# Patient Record
Sex: FEMALE | Race: WHITE | NOT HISPANIC OR LATINO | Employment: UNEMPLOYED | URBAN - METROPOLITAN AREA
[De-identification: names, ages, dates, MRNs, and addresses within clinical notes are randomized per-mention and may not be internally consistent; named-entity substitution may affect disease eponyms.]

---

## 2024-01-27 ENCOUNTER — HOSPITAL ENCOUNTER (OUTPATIENT)
Facility: HOSPITAL | Age: 69
Discharge: HOME | End: 2024-01-27
Attending: EMERGENCY MEDICINE
Payer: MEDICARE

## 2024-01-27 VITALS
BODY MASS INDEX: 33.66 KG/M2 | SYSTOLIC BLOOD PRESSURE: 163 MMHG | HEART RATE: 110 BPM | DIASTOLIC BLOOD PRESSURE: 101 MMHG | RESPIRATION RATE: 18 BRPM | OXYGEN SATURATION: 97 % | HEIGHT: 63 IN | TEMPERATURE: 99.6 F | WEIGHT: 190 LBS

## 2024-01-27 DIAGNOSIS — U07.1 COVID-19: Primary | ICD-10-CM

## 2024-01-27 LAB
FLUAV SUBTYP SPEC NAA+PROBE: NOT DETECTED
FLUBV RNA ISLT QL NAA+PROBE: NOT DETECTED
SARS-COV-2 RNA RESP QL NAA+PROBE: DETECTED

## 2024-01-27 PROCEDURE — 87636 SARSCOV2 & INF A&B AMP PRB: CPT | Performed by: NURSE PRACTITIONER

## 2024-01-27 PROCEDURE — G0463 HOSPITAL OUTPT CLINIC VISIT: HCPCS | Performed by: NURSE PRACTITIONER

## 2024-01-27 RX ORDER — GUAIFENESIN AND DEXTROMETHORPHAN HYDROBROMIDE 600; 30 MG/1; MG/1
1 TABLET, EXTENDED RELEASE ORAL 2 TIMES DAILY PRN
Qty: 20 EACH | Refills: 0 | Status: SHIPPED | OUTPATIENT
Start: 2024-01-27

## 2024-01-27 RX ORDER — BENZONATATE 100 MG/1
100 CAPSULE ORAL 4 TIMES DAILY PRN
Qty: 20 CAPSULE | Refills: 0 | Status: SHIPPED | OUTPATIENT
Start: 2024-01-27

## 2024-01-27 NOTE — FSED PROVIDER NOTE
Subjective   History of Present Illness  Patient is 68-year-old female who presents complaining of cough, congestion that started on Thursday.  States she just returned from a cruise.  She denies chest pain, shortness of breath, vomiting.      Review of Systems   Constitutional:  Positive for fatigue.   HENT:  Positive for congestion.    Respiratory:  Positive for cough.    All other systems reviewed and are negative.      Past Medical History:   Diagnosis Date    Arthritis     GERD (gastroesophageal reflux disease)     History of mononucleosis     Hypertension     Knee meniscus pain     right knee       No Known Allergies    Past Surgical History:   Procedure Laterality Date    BACK SURGERY      l 5 herniated disc    BREAST AUGMENTATION Bilateral     bilateral breasts implants    COLONOSCOPY      ENDOSCOPY      KNEE ARTHROSCOPY Left     meniscus    LAPAROSCOPIC TUBAL LIGATION      TONSILLECTOMY      TOTAL KNEE ARTHROPLASTY Left 2/4/2020    Procedure: TOTAL KNEE ARTHROPLASTY;  Surgeon: Ajay Bridges MD;  Location: [facility];  Service: Orthopedics;  Laterality: Left;       Family History   Problem Relation Age of Onset    Malig Hyperthermia Neg Hx        Social History     Socioeconomic History    Marital status:    Tobacco Use    Smoking status: Never    Smokeless tobacco: Never   Vaping Use    Vaping Use: Never used   Substance and Sexual Activity    Alcohol use: Yes     Comment: couple times a year    Drug use: Never    Sexual activity: Defer           Objective   Physical Exam  Vitals and nursing note reviewed.   Constitutional:       Appearance: Normal appearance.   HENT:      Head: Normocephalic and atraumatic.      Nose: Congestion present.   Pulmonary:      Effort: Pulmonary effort is normal.      Breath sounds: Normal breath sounds.   Musculoskeletal:      Cervical back: Normal range of motion and neck supple.   Skin:     General: Skin is warm and dry.      Capillary Refill: Capillary  refill takes less than 2 seconds.   Neurological:      General: No focal deficit present.      Mental Status: She is alert and oriented to person, place, and time.         Procedures           ED Course                                           Medical Decision Making  Patient is positive for COVID-19, she is otherwise nontoxic-appearing.  She is follow-up with her primary care and was given strict return precautions.    Problems Addressed:  COVID-19: complicated acute illness or injury    Risk  OTC drugs.  Prescription drug management.        Final diagnoses:   COVID-19       ED Disposition  ED Disposition       ED Disposition   Discharge    Condition   Stable    Comment   --               Gloria Fleming, APRN   [address]  [place] [id number] [phone]    Call   If symptoms worsen         Medication List        New Prescriptions      benzonatate 100 MG capsule  Commonly known as: TESSALON  Take 1 capsule by mouth 4 (Four) Times a Day As Needed for Cough.     guaifenesin-dextromethorphan  MG tablet sustained-release 12 hour tablet  Take 1 tablet by mouth 2 (Two) Times a Day As Needed (Congestion).               Where to Get Your Medications        These medications were sent to [id number] - [place], [place] - [address] AT [facility] [address] [facility] - [phone]  - [phone] [address] [address]      Phone: [phone]   benzonatate 100 MG capsule  guaifenesin-dextromethorphan  MG tablet sustained-release 12 hour tablet

## 2024-03-06 ENCOUNTER — PREP FOR SURGERY (OUTPATIENT)
Dept: OTHER | Facility: HOSPITAL | Age: 69
End: 2024-03-06

## 2024-03-06 DIAGNOSIS — M17.11 PRIMARY OSTEOARTHRITIS OF RIGHT KNEE: Primary | ICD-10-CM

## 2024-03-06 PROBLEM — M17.9 OSTEOARTHRITIS OF KNEE: Status: ACTIVE | Noted: 2024-03-06

## 2024-03-06 RX ORDER — CHLORHEXIDINE GLUCONATE 500 MG/1
CLOTH TOPICAL 2 TIMES DAILY
Status: CANCELLED | OUTPATIENT
Start: 2024-03-06

## 2024-03-06 RX ORDER — PREGABALIN 75 MG/1
75 CAPSULE ORAL ONCE
Status: CANCELLED | OUTPATIENT
Start: 2024-03-15 | End: 2024-03-06

## 2024-03-06 RX ORDER — ACETAMINOPHEN 10 MG/ML
1000 INJECTION, SOLUTION INTRAVENOUS ONCE
Status: CANCELLED | OUTPATIENT
Start: 2024-03-15 | End: 2024-03-06

## 2024-03-06 RX ORDER — CHLORHEXIDINE GLUCONATE 500 MG/1
CLOTH TOPICAL ONCE
Status: CANCELLED | OUTPATIENT
Start: 2024-03-15 | End: 2024-03-06

## 2024-03-06 NOTE — H&P
Chief Complaint  Right knee pain  Followup: Osteoarthritis of right knee joint  Patient's Care Team  Primary Care Provider: GUSTAVO ROCKWELL: [address] , [place] [address], Ph [phone], Fax [phone] NPI: [id number]  Patient's Pharmacies  [id number] ([place]): [address] , [place] [address], Ph [phone], Fax [phone]  Vitals  2024-03-06 08:28  Ht: 5 ft 2 in  Allergies  Reviewed Allergies  Uncoded Allergies  NO KNOWN ALLERGIES (Moderate) (Active)  Medications  Reviewed Medications  atorvastatin 10 mg tablet  02/07/24   filled surescripts  atorvastatin 20 mg tablet  02/15/24   filled surescripts  benzonatate 100 mg capsule  TAKE 1 CAPSULE BY MOUTH FOUR TIMES DAILY AS NEEDED FOR COUGH  01/27/24   filled surescripts  famotidine 40 mg tablet  10/18/23   filled surescripts  meloxicam 15 mg tablet  TAKE 1 TABLET BY MOUTH DAILY AS NEEDED FOR MILD PAIN  01/23/24   filled surescripts  Mucus DM 30 mg-600 mg tablet,extended release  TAKE 1 TABLET BY MOUTH TWICE DAILY AS NEEDED FOR CONGESTION  01/27/24   filled surescripts  omeprazole 20 mg capsule,delayed release  02/07/24   filled surescripts  omeprazole 40 mg capsule,delayed release  10/18/23   filled surescripts  Vaccines  Vaccines not reviewed (last reviewed 10/06/2023)  no flu 2022  Problems  Reviewed Problems  Osteoarthritis of right knee joint - Onset: 12/29/2022  Pain of right knee joint - Onset: 07/11/2023  Family History  Family History not reviewed (last reviewed 10/06/2023)  Social History  Social History not reviewed (last reviewed 10/06/2023)  Public Health and Travel  Have you recently traveled abroad?: No  Have you been to an area known to be high risk for COVID-19?: No  In the 14 days before symptom onset, have you had close contact with a laboratory-confirmed COVID-19 while that case was ill?: No  In the 14 days before symptom onset, have you had close contact with a person who is under investigation for  COVID-19 while that person was ill?: No  Substance Use  Do you or have you ever smoked tobacco?: Never smoker  Do you or have you ever used any other forms of tobacco or nicotine?: No  Has tobacco cessation counseling been provided?: No  What is your level of alcohol consumption?: Occasional  How many times per week do you consume alcohol?: Less than 1 time per week  Do you use any illicit or recreational drugs?: No  Advance Directive  Do you have an advance directive?: No  Do you have a medical power of ?: No  Surgical History  Surgical History not reviewed (last reviewed 10/06/2023)  Total replacement of left knee joint  Past Medical History  Past Medical History not reviewed (last reviewed 10/06/2023)  High Cholesterol: RITCHIE Montaño is a 68-year-old female who comes into the office today for worsening of the right knee pain.    She has a known history of right knee osteoarthritis which we have been managing with periodic steroid and hyaluronic acid injections. She received a gel injection in January 2024. She feels that it has not helped much. She has been receiving on and off steroid injections at regular intervals. She had some relief with this injection, however she feels as if it has not helped as well as previous injections.    She has reached a point of disability. She has difficulty with activities of daily living. She is here in the office to discuss a right total knee replacement.    She denies any recent injury or fall. She states the pain is an aching pain of about a 8 out of 10 on the pain scale. Rest makes it better while activity makes it worse. She has problems with prolonged walking/standing, taking stairs, going from a seated to standing position. She denies mechanical symptoms. She has also noticed some groin pain from time to time. This comes and goes quickly and does not happen every day.    She takes meloxicam daily.    She is ambulatory without the help of an assistive device.  She  has been staying active by doing a boot camp 3 times a week. She does notice some increased pain after this activity.  She has a history of a left total knee arthroplasty. She is doing well with the left knee.  She has been helping care for her mother as she has been in the hospital over the holidays. She thinks this is exacerbated her symptoms.    Denies any history of MRSA, DVT, cardiac problems.  ROS  Patient reports arthralgias/joint pain; right knee evaluation for sx.  ROS as noted in the HPI  Physical Exam  Right KNEE  Skin is normal.  Positive varus deformity  There is quad atrophy.  Effusion is 1+.  No warmth. No erythema. Normal sensation. Capillary refill is normal.  Reflexes are normal. Range of motion of the knee is 5 to <120 degrees of flexion. There is moderate tenderness in the medial patella femoral. Moderate patellar crepitation.  The leg lengths are equal.  Assessment / Plan  1. Osteoarthritis of right knee joint  M17.11: Unilateral primary osteoarthritis, right knee  MEDICAL CLEARANCE* -     Note to Provider: Scheduled for Total knee replacement at [facility]. Please provide medical clearance.    Discussion Notes  X-rays from previous office visit have been reviewed. Confirms presence of complete loss of medial joint space with bone-on-bone arthritis, varus deformity, medial tibial osteophyte formation.    Options and alternatives were discussed in detail with the patient.  The patient has reached a point of disability and has failed nonoperative management. The patient is indicated for a right total knee arthroplasty.    Likely, Risks and benefits of the procedure including but not limited to infection, DVT, pulmonary embolism, stiffness, future loosening of the implants, possibility of injury to nerves vessels and tendons, periprosthetic fractures have been discussed in detail. Despite the risks involved, The patient would like to proceed.  The patient is being scheduled for a right total  knee arthroplasty at [facility] on March 15/22nd of 2024.  I will request for Medical and cardiac clearance from her primary care provider Gloria Maria  Postoperative DVT prophylaxis - Patient has no high risk factors Plan for ASPIRIN .  Preoperative antibiotic prophylaxis - Plan for SCIP protocol with CEFAZOLIN weight based. Will give VANCOMYCIN in addition due to increased BMI.  Surgery will be scheduled for postoperative observation.

## 2024-03-06 NOTE — H&P (VIEW-ONLY)
Chief Complaint  Right knee pain  Followup: Osteoarthritis of right knee joint  Patient's Care Team  Primary Care Provider: GUSTAVO ROCKWELL: [address] , [place] [address], Ph [phone], Fax [phone] NPI: [id number]  Patient's Pharmacies  [id number] ([place]): [address] , [place] [address], Ph [phone], Fax [phone]  Vitals  2024-03-06 08:28  Ht: 5 ft 2 in  Allergies  Reviewed Allergies  Uncoded Allergies  NO KNOWN ALLERGIES (Moderate) (Active)  Medications  Reviewed Medications  atorvastatin 10 mg tablet  02/07/24   filled surescripts  atorvastatin 20 mg tablet  02/15/24   filled surescripts  benzonatate 100 mg capsule  TAKE 1 CAPSULE BY MOUTH FOUR TIMES DAILY AS NEEDED FOR COUGH  01/27/24   filled surescripts  famotidine 40 mg tablet  10/18/23   filled surescripts  meloxicam 15 mg tablet  TAKE 1 TABLET BY MOUTH DAILY AS NEEDED FOR MILD PAIN  01/23/24   filled surescripts  Mucus DM 30 mg-600 mg tablet,extended release  TAKE 1 TABLET BY MOUTH TWICE DAILY AS NEEDED FOR CONGESTION  01/27/24   filled surescripts  omeprazole 20 mg capsule,delayed release  02/07/24   filled surescripts  omeprazole 40 mg capsule,delayed release  10/18/23   filled surescripts  Vaccines  Vaccines not reviewed (last reviewed 10/06/2023)  no flu 2022  Problems  Reviewed Problems  Osteoarthritis of right knee joint - Onset: 12/29/2022  Pain of right knee joint - Onset: 07/11/2023  Family History  Family History not reviewed (last reviewed 10/06/2023)  Social History  Social History not reviewed (last reviewed 10/06/2023)  Public Health and Travel  Have you recently traveled abroad?: No  Have you been to an area known to be high risk for COVID-19?: No  In the 14 days before symptom onset, have you had close contact with a laboratory-confirmed COVID-19 while that case was ill?: No  In the 14 days before symptom onset, have you had close contact with a person who is under investigation for  COVID-19 while that person was ill?: No  Substance Use  Do you or have you ever smoked tobacco?: Never smoker  Do you or have you ever used any other forms of tobacco or nicotine?: No  Has tobacco cessation counseling been provided?: No  What is your level of alcohol consumption?: Occasional  How many times per week do you consume alcohol?: Less than 1 time per week  Do you use any illicit or recreational drugs?: No  Advance Directive  Do you have an advance directive?: No  Do you have a medical power of ?: No  Surgical History  Surgical History not reviewed (last reviewed 10/06/2023)  Total replacement of left knee joint  Past Medical History  Past Medical History not reviewed (last reviewed 10/06/2023)  High Cholesterol: RITCHIE Montaño is a 68-year-old female who comes into the office today for worsening of the right knee pain.    She has a known history of right knee osteoarthritis which we have been managing with periodic steroid and hyaluronic acid injections. She received a gel injection in January 2024. She feels that it has not helped much. She has been receiving on and off steroid injections at regular intervals. She had some relief with this injection, however she feels as if it has not helped as well as previous injections.    She has reached a point of disability. She has difficulty with activities of daily living. She is here in the office to discuss a right total knee replacement.    She denies any recent injury or fall. She states the pain is an aching pain of about a 8 out of 10 on the pain scale. Rest makes it better while activity makes it worse. She has problems with prolonged walking/standing, taking stairs, going from a seated to standing position. She denies mechanical symptoms. She has also noticed some groin pain from time to time. This comes and goes quickly and does not happen every day.    She takes meloxicam daily.    She is ambulatory without the help of an assistive device.  She  has been staying active by doing a boot camp 3 times a week. She does notice some increased pain after this activity.  She has a history of a left total knee arthroplasty. She is doing well with the left knee.  She has been helping care for her mother as she has been in the hospital over the holidays. She thinks this is exacerbated her symptoms.    Denies any history of MRSA, DVT, cardiac problems.  ROS  Patient reports arthralgias/joint pain; right knee evaluation for sx.  ROS as noted in the HPI  Physical Exam  Right KNEE  Skin is normal.  Positive varus deformity  There is quad atrophy.  Effusion is 1+.  No warmth. No erythema. Normal sensation. Capillary refill is normal.  Reflexes are normal. Range of motion of the knee is 5 to <120 degrees of flexion. There is moderate tenderness in the medial patella femoral. Moderate patellar crepitation.  The leg lengths are equal.  Assessment / Plan  1. Osteoarthritis of right knee joint  M17.11: Unilateral primary osteoarthritis, right knee  MEDICAL CLEARANCE* -     Note to Provider: Scheduled for Total knee replacement at [facility]. Please provide medical clearance.    Discussion Notes  X-rays from previous office visit have been reviewed. Confirms presence of complete loss of medial joint space with bone-on-bone arthritis, varus deformity, medial tibial osteophyte formation.    Options and alternatives were discussed in detail with the patient.  The patient has reached a point of disability and has failed nonoperative management. The patient is indicated for a right total knee arthroplasty.    Likely, Risks and benefits of the procedure including but not limited to infection, DVT, pulmonary embolism, stiffness, future loosening of the implants, possibility of injury to nerves vessels and tendons, periprosthetic fractures have been discussed in detail. Despite the risks involved, The patient would like to proceed.  The patient is being scheduled for a right total  knee arthroplasty at [facility] on March 15/22nd of 2024.  I will request for Medical and cardiac clearance from her primary care provider Gloria Maria  Postoperative DVT prophylaxis - Patient has no high risk factors Plan for ASPIRIN .  Preoperative antibiotic prophylaxis - Plan for SCIP protocol with CEFAZOLIN weight based. Will give VANCOMYCIN in addition due to increased BMI.  Surgery will be scheduled for postoperative observation.

## 2024-03-08 ENCOUNTER — PRE-ADMISSION TESTING (OUTPATIENT)
Dept: PREADMISSION TESTING | Facility: HOSPITAL | Age: 69
End: 2024-03-08
Payer: MEDICARE

## 2024-03-08 ENCOUNTER — HOSPITAL ENCOUNTER (OUTPATIENT)
Dept: GENERAL RADIOLOGY | Facility: HOSPITAL | Age: 69
Discharge: HOME | End: 2024-03-08
Payer: MEDICARE

## 2024-03-08 VITALS
HEIGHT: 63 IN | BODY MASS INDEX: 34.75 KG/M2 | HEART RATE: 91 BPM | RESPIRATION RATE: 18 BRPM | WEIGHT: 196.1 LBS | OXYGEN SATURATION: 97 % | SYSTOLIC BLOOD PRESSURE: 144 MMHG | DIASTOLIC BLOOD PRESSURE: 87 MMHG | TEMPERATURE: 97.9 F

## 2024-03-08 LAB
ALBUMIN SERPL-MCNC: 4.2 G/DL (ref 3.5–5.2)
ALBUMIN/GLOB SERPL: 1.7 G/DL
ALP SERPL-CCNC: 104 U/L (ref 39–117)
ALT SERPL W P-5'-P-CCNC: 16 U/L (ref 1–33)
ANION GAP SERPL CALCULATED.3IONS-SCNC: 10 MMOL/L (ref 5–15)
APTT PPP: 26 SECONDS (ref 22.7–35.4)
AST SERPL-CCNC: 14 U/L (ref 1–32)
BASOPHILS # BLD AUTO: 0.04 10*3/MM3 (ref 0–0.2)
BASOPHILS NFR BLD AUTO: 0.6 % (ref 0–1.5)
BILIRUB SERPL-MCNC: 0.3 MG/DL (ref 0–1.2)
BUN SERPL-MCNC: 18 MG/DL (ref 8–23)
BUN/CREAT SERPL: 24.7 (ref 7–25)
CALCIUM SPEC-SCNC: 9.3 MG/DL (ref 8.6–10.5)
CHLORIDE SERPL-SCNC: 104 MMOL/L (ref 98–107)
CO2 SERPL-SCNC: 28 MMOL/L (ref 22–29)
CREAT SERPL-MCNC: 0.73 MG/DL (ref 0.57–1)
DEPRECATED RDW RBC AUTO: 39.9 FL (ref 37–54)
EGFRCR SERPLBLD CKD-EPI 2021: 90.3 ML/MIN/1.73
EOSINOPHIL # BLD AUTO: 0.15 10*3/MM3 (ref 0–0.4)
EOSINOPHIL NFR BLD AUTO: 2.4 % (ref 0.3–6.2)
ERYTHROCYTE [DISTWIDTH] IN BLOOD BY AUTOMATED COUNT: 12.6 % (ref 12.3–15.4)
GLOBULIN UR ELPH-MCNC: 2.5 GM/DL
GLUCOSE SERPL-MCNC: 113 MG/DL (ref 65–99)
HCT VFR BLD AUTO: 38.4 % (ref 34–46.6)
HGB BLD-MCNC: 12.6 G/DL (ref 12–15.9)
IMM GRANULOCYTES # BLD AUTO: 0.02 10*3/MM3 (ref 0–0.05)
IMM GRANULOCYTES NFR BLD AUTO: 0.3 % (ref 0–0.5)
INR PPP: 0.94 (ref 0.9–1.1)
LYMPHOCYTES # BLD AUTO: 1.7 10*3/MM3 (ref 0.7–3.1)
LYMPHOCYTES NFR BLD AUTO: 27 % (ref 19.6–45.3)
MCH RBC QN AUTO: 28.4 PG (ref 26.6–33)
MCHC RBC AUTO-ENTMCNC: 32.8 G/DL (ref 31.5–35.7)
MCV RBC AUTO: 86.5 FL (ref 79–97)
MONOCYTES # BLD AUTO: 0.66 10*3/MM3 (ref 0.1–0.9)
MONOCYTES NFR BLD AUTO: 10.5 % (ref 5–12)
NEUTROPHILS NFR BLD AUTO: 3.72 10*3/MM3 (ref 1.7–7)
NEUTROPHILS NFR BLD AUTO: 59.2 % (ref 42.7–76)
NRBC BLD AUTO-RTO: 0 /100 WBC (ref 0–0.2)
PLATELET # BLD AUTO: 291 10*3/MM3 (ref 140–450)
PMV BLD AUTO: 10.3 FL (ref 6–12)
POTASSIUM SERPL-SCNC: 3.8 MMOL/L (ref 3.5–5.2)
PROT SERPL-MCNC: 6.7 G/DL (ref 6–8.5)
PROTHROMBIN TIME: 12.6 SECONDS (ref 11.7–14.2)
QT INTERVAL: 400 MS
QTC INTERVAL: 429 MS
RBC # BLD AUTO: 4.44 10*6/MM3 (ref 3.77–5.28)
SODIUM SERPL-SCNC: 142 MMOL/L (ref 136–145)
WBC NRBC COR # BLD AUTO: 6.29 10*3/MM3 (ref 3.4–10.8)

## 2024-03-08 PROCEDURE — 85610 PROTHROMBIN TIME: CPT

## 2024-03-08 PROCEDURE — 85025 COMPLETE CBC W/AUTO DIFF WBC: CPT

## 2024-03-08 PROCEDURE — 80053 COMPREHEN METABOLIC PANEL: CPT

## 2024-03-08 PROCEDURE — 93005 ELECTROCARDIOGRAM TRACING: CPT

## 2024-03-08 PROCEDURE — 85730 THROMBOPLASTIN TIME PARTIAL: CPT

## 2024-03-08 PROCEDURE — 71046 X-RAY EXAM CHEST 2 VIEWS: CPT

## 2024-03-08 PROCEDURE — 73560 X-RAY EXAM OF KNEE 1 OR 2: CPT

## 2024-03-08 PROCEDURE — 36415 COLL VENOUS BLD VENIPUNCTURE: CPT

## 2024-03-08 PROCEDURE — 93010 ELECTROCARDIOGRAM REPORT: CPT | Performed by: INTERNAL MEDICINE

## 2024-03-08 RX ORDER — OMEPRAZOLE 20 MG/1
20 CAPSULE, DELAYED RELEASE ORAL DAILY
COMMUNITY

## 2024-03-08 RX ORDER — ATORVASTATIN CALCIUM 20 MG/1
20 TABLET, FILM COATED ORAL DAILY
COMMUNITY

## 2024-03-08 NOTE — DISCHARGE INSTRUCTIONS
Take the following medications the morning of surgery: OMEPRAZOLE       If you are on prescription narcotic pain medication to control your pain you may also take that medication the morning of surgery.    General Instructions:  Do not eat solid food after midnight the night before surgery.  You may drink clear liquids day of surgery but must stop at least one hour before your hospital arrival time.  It is beneficial for you to have a clear drink that contains carbohydrates the day of surgery.  We suggest a 12 to 20 ounce bottle of Gatorade or Powerade for non-diabetic patients or a 12 to 20 ounce bottle of G2 or Powerade Zero for diabetic patients. (Pediatric patients, are not advised to drink a 12 to 20 ounce carbohydrate drink)    Clear liquids are liquids you can see through.  Nothing red in color.     Plain water                               Sports drinks  Sodas                                   Gelatin (Jell-O)  Fruit juices without pulp such as white grape juice and apple juice  Popsicles that contain no fruit or yogurt  Tea or coffee (no cream or milk added)  Gatorade / Powerade  G2 / Powerade Zero    Infants may have breast milk up to four hours before surgery.  Infants drinking formula may drink formula up to six hours before surgery.   Patients who avoid smoking, chewing tobacco and alcohol for 4 weeks prior to surgery have a reduced risk of post-operative complications.  Quit smoking as many days before surgery as you can.  Do not smoke, use chewing tobacco or drink alcohol the day of surgery.   If applicable bring your C-PAP/ BI-PAP machine in with you to preop day of surgery.  Bring any papers given to you in the doctor’s office.  Wear clean comfortable clothes.  Do not wear contact lenses, false eyelashes or make-up.  Bring a case for your glasses.   Bring crutches or walker if applicable.  Remove all piercings.  Leave jewelry and any other valuables at home.  Hair extensions with metal clips must be  removed prior to surgery.  The Pre-Admission Testing nurse will instruct you to bring medications if unable to obtain an accurate list in Pre-Admission Testing.        If you were given a blood bank ID arm band remember to bring it with you the day of surgery.    Preventing a Surgical Site Infection:  For 2 to 3 days before surgery, avoid shaving with a razor because the razor can irritate skin and make it easier to develop an infection.    Any areas of open skin can increase the risk of a post-operative wound infection by allowing bacteria to enter and travel throughout the body.  Notify your surgeon if you have any skin wounds / rashes even if it is not near the expected surgical site.  The area will need assessed to determine if surgery should be delayed until it is healed.  The night prior to surgery shower using a fresh bar of anti-bacterial soap (such as Dial) and clean washcloth.  Sleep in a clean bed with clean clothing.  Do not allow pets to sleep with you.  Shower on the morning of surgery using a fresh bar of anti-bacterial soap (such as Dial) and clean washcloth.  Dry with a clean towel and dress in clean clothing.  Ask your surgeon if you will be receiving antibiotics prior to surgery.  Make sure you, your family, and all healthcare providers clean their hands with soap and water or an alcohol based hand  before caring for you or your wound.  CHLORHEXIDINE CLOTH INSTRUCTIONS  The morning of surgery follow these instructions using the Chlorhexidine cloths you've been given.  These steps reduce bacteria on the body.  Do not use the cloths near your eyes, ears mouth, genitalia or on open wounds.  Throw the cloths away after use but do not try to flush them down a toilet.      Open and remove one cloth at a time from the package.    Leave the cloth unfolded and begin the bathing.  Massage the skin with the cloths using gentle pressure to remove bacteria.  Do not scrub harshly.   Follow the steps  below with one 2% CHG cloth per area (6 total cloths).  One cloth for neck, shoulders and chest.  One cloth for both arms, hands, fingers and underarms (do underarms last).  One cloth for the abdomen followed by groin.  One cloth for right leg and foot including between the toes.  One cloth for left leg and foot including between the toes.  The last cloth is to be used for the back of the neck, back and buttocks.    Allow the CHG to air dry 3 minutes on the skin which will give it time to work and decrease the chance of irritation.  The skin may feel sticky until it is dry.  Do not rinse with water or any other liquid or you will lose the beneficial effects of the CHG.  If mild skin irritation occurs, do rinse the skin to remove the CHG.  Report this to the nurse at time of admission.  Do not apply lotions, creams, ointments, deodorants or perfumes after using the clothes. Dress in clean clothes before coming to the hospital.  Day of surgery:  Your arrival time is approximately two hours before your scheduled surgery time.  Upon arrival, a Pre-op nurse and Anesthesiologist will review your health history, obtain vital signs, and answer questions you may have.  The only belongings needed at this time will be a list of your home medications and if applicable your C-PAP/BI-PAP machine.  A Pre-op nurse will start an IV and you may receive medication in preparation for surgery, including something to help you relax.     Please be aware that surgery does come with discomfort.  We want to make every effort to control your discomfort so please discuss any uncontrolled symptoms with your nurse.   Your doctor will most likely have prescribed pain medications.      If you are going home after surgery you will receive individualized written care instructions before being discharged.  A responsible adult must drive you to and from the hospital on the day of your surgery and ideally stay with you through the night.   .  Discharge  prescriptions can be filled by the hospital pharmacy during regular pharmacy hours.  If you are having surgery late in the day/evening your prescription may be e-prescribed to your pharmacy.  Please verify your pharmacy hours or chose a 24 hour pharmacy to avoid not having access to your prescription because your pharmacy has closed for the day.    If you are staying overnight following surgery, you will be transported to your hospital room following the recovery period.  [facility] has all private rooms.    If you have any questions please call Pre-Admission Testing at [phone].  Deductibles and co-payments are collected on the day of service. Please be prepared to pay the required co-pay, deductible or deposit on the day of service as defined by your plan.    Call your surgeon immediately if you experience any of the following symptoms:  Sore Throat  Shortness of Breath or difficulty breathing  Cough  Chills  Body soreness or muscle pain  Headache  Fever  New loss of taste or smell  Do not arrive for your surgery ill.  Your procedure will need to be rescheduled to another time.  You will need to call your physician before the day of surgery to avoid any unnecessary exposure to hospital staff as well as other patients.

## 2024-03-15 ENCOUNTER — APPOINTMENT (OUTPATIENT)
Dept: GENERAL RADIOLOGY | Facility: HOSPITAL | Age: 69
End: 2024-03-15
Payer: MEDICARE

## 2024-03-15 ENCOUNTER — ANESTHESIA (OUTPATIENT)
Dept: PERIOP | Facility: HOSPITAL | Age: 69
End: 2024-03-15
Payer: MEDICARE

## 2024-03-15 ENCOUNTER — HOSPITAL ENCOUNTER (OUTPATIENT)
Facility: HOSPITAL | Age: 69
Discharge: HOME | End: 2024-03-16
Attending: ORTHOPAEDIC SURGERY | Admitting: ORTHOPAEDIC SURGERY
Payer: MEDICARE

## 2024-03-15 ENCOUNTER — ANESTHESIA EVENT (OUTPATIENT)
Dept: PERIOP | Facility: HOSPITAL | Age: 69
End: 2024-03-15
Payer: MEDICARE

## 2024-03-15 DIAGNOSIS — Z96.651 S/P TKR (TOTAL KNEE REPLACEMENT) USING CEMENT, RIGHT: Primary | ICD-10-CM

## 2024-03-15 PROBLEM — M17.9 OSTEOARTHRITIS OF KNEE: Status: RESOLVED | Noted: 2024-03-06 | Resolved: 2024-03-15

## 2024-03-15 PROCEDURE — C1776 JOINT DEVICE (IMPLANTABLE): HCPCS | Performed by: ORTHOPAEDIC SURGERY

## 2024-03-15 PROCEDURE — 25010000002 ROPIVACAINE PER 1 MG: Performed by: STUDENT IN AN ORGANIZED HEALTH CARE EDUCATION/TRAINING PROGRAM

## 2024-03-15 PROCEDURE — 25010000002 SUGAMMADEX 200 MG/2ML SOLUTION: Performed by: STUDENT IN AN ORGANIZED HEALTH CARE EDUCATION/TRAINING PROGRAM

## 2024-03-15 PROCEDURE — 25010000002 VANCOMYCIN HCL 1.25 G RECONSTITUTED SOLUTION 1 EACH VIAL: Performed by: ORTHOPAEDIC SURGERY

## 2024-03-15 PROCEDURE — C1713 ANCHOR/SCREW BN/BN,TIS/BN: HCPCS | Performed by: ORTHOPAEDIC SURGERY

## 2024-03-15 PROCEDURE — 25810000003 LACTATED RINGERS PER 1000 ML: Performed by: STUDENT IN AN ORGANIZED HEALTH CARE EDUCATION/TRAINING PROGRAM

## 2024-03-15 PROCEDURE — 25010000002 PROPOFOL 10 MG/ML EMULSION: Performed by: NURSE ANESTHETIST, CERTIFIED REGISTERED

## 2024-03-15 PROCEDURE — 25010000002 DEXAMETHASONE PER 1 MG: Performed by: STUDENT IN AN ORGANIZED HEALTH CARE EDUCATION/TRAINING PROGRAM

## 2024-03-15 PROCEDURE — 25010000002 HYDROMORPHONE 1 MG/ML SOLUTION: Performed by: NURSE ANESTHETIST, CERTIFIED REGISTERED

## 2024-03-15 PROCEDURE — 73560 X-RAY EXAM OF KNEE 1 OR 2: CPT

## 2024-03-15 PROCEDURE — G0378 HOSPITAL OBSERVATION PER HR: HCPCS

## 2024-03-15 PROCEDURE — 25010000002 CEFAZOLIN PER 500 MG: Performed by: ORTHOPAEDIC SURGERY

## 2024-03-15 PROCEDURE — 25010000002 ONDANSETRON PER 1 MG: Performed by: NURSE ANESTHETIST, CERTIFIED REGISTERED

## 2024-03-15 PROCEDURE — 25010000002 ACETAMINOPHEN 10 MG/ML SOLUTION: Performed by: ORTHOPAEDIC SURGERY

## 2024-03-15 PROCEDURE — 25010000002 FENTANYL CITRATE (PF) 50 MCG/ML SOLUTION: Performed by: NURSE ANESTHETIST, CERTIFIED REGISTERED

## 2024-03-15 PROCEDURE — 25010000002 PHENYLEPHRINE 10 MG/ML SOLUTION: Performed by: NURSE ANESTHETIST, CERTIFIED REGISTERED

## 2024-03-15 PROCEDURE — 25010000002 HYDROMORPHONE PER 4 MG: Performed by: ORTHOPAEDIC SURGERY

## 2024-03-15 PROCEDURE — 0 BUPIVACAINE LIPOSOME 1.3 % SUSPENSION 20 ML VIAL: Performed by: ORTHOPAEDIC SURGERY

## 2024-03-15 PROCEDURE — 25010000002 GLYCOPYRROLATE 0.2 MG/ML SOLUTION: Performed by: NURSE ANESTHETIST, CERTIFIED REGISTERED

## 2024-03-15 PROCEDURE — 25810000003 SODIUM CHLORIDE 0.9 % SOLUTION 250 ML FLEX CONT: Performed by: ORTHOPAEDIC SURGERY

## 2024-03-15 PROCEDURE — 25010000002 MIDAZOLAM PER 1 MG: Performed by: STUDENT IN AN ORGANIZED HEALTH CARE EDUCATION/TRAINING PROGRAM

## 2024-03-15 PROCEDURE — C9290 INJ, BUPIVACAINE LIPOSOME: HCPCS | Performed by: ORTHOPAEDIC SURGERY

## 2024-03-15 PROCEDURE — 25010000002 BUPIVACAINE 0.5 % SOLUTION: Performed by: ORTHOPAEDIC SURGERY

## 2024-03-15 DEVICE — CAP TOTL KN CMT PRIMARY: Type: IMPLANTABLE DEVICE | Status: FUNCTIONAL

## 2024-03-15 DEVICE — ART/SRF KN PERSONA/VE MC EF 6TO7 10MM RT: Type: IMPLANTABLE DEVICE | Site: KNEE | Status: FUNCTIONAL

## 2024-03-15 DEVICE — DEV CONTRL TISS STRATAFIX PDS PLS OS6 REV SZ1 18IN 45CM: Type: IMPLANTABLE DEVICE | Site: KNEE | Status: FUNCTIONAL

## 2024-03-15 DEVICE — CMT BONE SIMPLEX/P FULL DOSE 10/PK: Type: IMPLANTABLE DEVICE | Site: KNEE | Status: FUNCTIONAL

## 2024-03-15 DEVICE — PAT KN PERSONA VE CRS/LNK CMT 8X29MM: Type: IMPLANTABLE DEVICE | Site: KNEE | Status: FUNCTIONAL

## 2024-03-15 DEVICE — COMP FEM/KN PERSONA CR CMT NRW SZ6 RT: Type: IMPLANTABLE DEVICE | Site: KNEE | Status: FUNCTIONAL

## 2024-03-15 DEVICE — STEM TIB/KN PERSONA CMT 5D SZD RT: Type: IMPLANTABLE DEVICE | Site: KNEE | Status: FUNCTIONAL

## 2024-03-15 DEVICE — DEV CONTRL TISS STRATAFIX SPIRAL MNCRYL UD 3/0 PLS 30CM: Type: IMPLANTABLE DEVICE | Site: KNEE | Status: FUNCTIONAL

## 2024-03-15 DEVICE — SCRW HEX PERSONA FML 2.5X25MM PK/2: Type: IMPLANTABLE DEVICE | Site: KNEE | Status: FUNCTIONAL

## 2024-03-15 RX ORDER — MAGNESIUM HYDROXIDE 1200 MG/15ML
LIQUID ORAL AS NEEDED
Status: DISCONTINUED | OUTPATIENT
Start: 2024-03-15 | End: 2024-03-15 | Stop reason: HOSPADM

## 2024-03-15 RX ORDER — PROPOFOL 10 MG/ML
VIAL (ML) INTRAVENOUS AS NEEDED
Status: DISCONTINUED | OUTPATIENT
Start: 2024-03-15 | End: 2024-03-15 | Stop reason: SURG

## 2024-03-15 RX ORDER — SODIUM CHLORIDE 9 MG/ML
100 INJECTION, SOLUTION INTRAVENOUS CONTINUOUS
Status: DISCONTINUED | OUTPATIENT
Start: 2024-03-15 | End: 2024-03-16 | Stop reason: HOSPADM

## 2024-03-15 RX ORDER — TRANEXAMIC ACID 100 MG/ML
INJECTION, SOLUTION INTRAVENOUS AS NEEDED
Status: DISCONTINUED | OUTPATIENT
Start: 2024-03-15 | End: 2024-03-15 | Stop reason: SURG

## 2024-03-15 RX ORDER — DEXAMETHASONE SODIUM PHOSPHATE 4 MG/ML
INJECTION, SOLUTION INTRA-ARTICULAR; INTRALESIONAL; INTRAMUSCULAR; INTRAVENOUS; SOFT TISSUE
Status: COMPLETED | OUTPATIENT
Start: 2024-03-15 | End: 2024-03-15

## 2024-03-15 RX ORDER — MIDAZOLAM HYDROCHLORIDE 1 MG/ML
0.5 INJECTION INTRAMUSCULAR; INTRAVENOUS
Status: DISCONTINUED | OUTPATIENT
Start: 2024-03-15 | End: 2024-03-15 | Stop reason: HOSPADM

## 2024-03-15 RX ORDER — HYDROCODONE BITARTRATE AND ACETAMINOPHEN 5; 325 MG/1; MG/1
1 TABLET ORAL ONCE AS NEEDED
Status: DISCONTINUED | OUTPATIENT
Start: 2024-03-15 | End: 2024-03-15 | Stop reason: HOSPADM

## 2024-03-15 RX ORDER — ONDANSETRON 4 MG/1
4 TABLET, ORALLY DISINTEGRATING ORAL EVERY 6 HOURS PRN
Status: DISCONTINUED | OUTPATIENT
Start: 2024-03-15 | End: 2024-03-16 | Stop reason: HOSPADM

## 2024-03-15 RX ORDER — BUPIVACAINE HYDROCHLORIDE 5 MG/ML
INJECTION, SOLUTION PERINEURAL AS NEEDED
Status: DISCONTINUED | OUTPATIENT
Start: 2024-03-15 | End: 2024-03-15 | Stop reason: HOSPADM

## 2024-03-15 RX ORDER — FLUMAZENIL 0.1 MG/ML
0.2 INJECTION INTRAVENOUS AS NEEDED
Status: DISCONTINUED | OUTPATIENT
Start: 2024-03-15 | End: 2024-03-15 | Stop reason: HOSPADM

## 2024-03-15 RX ORDER — HYDROCODONE BITARTRATE AND ACETAMINOPHEN 7.5; 325 MG/1; MG/1
2 TABLET ORAL EVERY 4 HOURS PRN
Status: DISCONTINUED | OUTPATIENT
Start: 2024-03-15 | End: 2024-03-16 | Stop reason: HOSPADM

## 2024-03-15 RX ORDER — HYDRALAZINE HYDROCHLORIDE 20 MG/ML
5 INJECTION INTRAMUSCULAR; INTRAVENOUS
Status: DISCONTINUED | OUTPATIENT
Start: 2024-03-15 | End: 2024-03-15 | Stop reason: HOSPADM

## 2024-03-15 RX ORDER — SODIUM CHLORIDE 0.9 % (FLUSH) 0.9 %
3-10 SYRINGE (ML) INJECTION AS NEEDED
Status: DISCONTINUED | OUTPATIENT
Start: 2024-03-15 | End: 2024-03-15 | Stop reason: HOSPADM

## 2024-03-15 RX ORDER — PROMETHAZINE HYDROCHLORIDE 25 MG/1
25 SUPPOSITORY RECTAL ONCE AS NEEDED
Status: DISCONTINUED | OUTPATIENT
Start: 2024-03-15 | End: 2024-03-15 | Stop reason: HOSPADM

## 2024-03-15 RX ORDER — DROPERIDOL 2.5 MG/ML
0.62 INJECTION, SOLUTION INTRAMUSCULAR; INTRAVENOUS
Status: DISCONTINUED | OUTPATIENT
Start: 2024-03-15 | End: 2024-03-15 | Stop reason: HOSPADM

## 2024-03-15 RX ORDER — BISACODYL 5 MG/1
10 TABLET, DELAYED RELEASE ORAL DAILY PRN
Status: DISCONTINUED | OUTPATIENT
Start: 2024-03-16 | End: 2024-03-16 | Stop reason: HOSPADM

## 2024-03-15 RX ORDER — CHLORHEXIDINE GLUCONATE 500 MG/1
CLOTH TOPICAL ONCE
Status: COMPLETED | OUTPATIENT
Start: 2024-03-15 | End: 2024-03-15

## 2024-03-15 RX ORDER — GLYCOPYRROLATE 0.2 MG/ML
INJECTION INTRAMUSCULAR; INTRAVENOUS AS NEEDED
Status: DISCONTINUED | OUTPATIENT
Start: 2024-03-15 | End: 2024-03-15 | Stop reason: SURG

## 2024-03-15 RX ORDER — LIDOCAINE HYDROCHLORIDE 20 MG/ML
INJECTION, SOLUTION INFILTRATION; PERINEURAL AS NEEDED
Status: DISCONTINUED | OUTPATIENT
Start: 2024-03-15 | End: 2024-03-15 | Stop reason: SURG

## 2024-03-15 RX ORDER — FENTANYL CITRATE 50 UG/ML
INJECTION, SOLUTION INTRAMUSCULAR; INTRAVENOUS AS NEEDED
Status: DISCONTINUED | OUTPATIENT
Start: 2024-03-15 | End: 2024-03-15 | Stop reason: SURG

## 2024-03-15 RX ORDER — IPRATROPIUM BROMIDE AND ALBUTEROL SULFATE 2.5; .5 MG/3ML; MG/3ML
3 SOLUTION RESPIRATORY (INHALATION) ONCE AS NEEDED
Status: DISCONTINUED | OUTPATIENT
Start: 2024-03-15 | End: 2024-03-15 | Stop reason: HOSPADM

## 2024-03-15 RX ORDER — HYDROCODONE BITARTRATE AND ACETAMINOPHEN 7.5; 325 MG/1; MG/1
1 TABLET ORAL EVERY 4 HOURS PRN
Status: DISCONTINUED | OUTPATIENT
Start: 2024-03-15 | End: 2024-03-16 | Stop reason: HOSPADM

## 2024-03-15 RX ORDER — NALOXONE HCL 0.4 MG/ML
0.1 VIAL (ML) INJECTION
Status: DISCONTINUED | OUTPATIENT
Start: 2024-03-15 | End: 2024-03-16 | Stop reason: HOSPADM

## 2024-03-15 RX ORDER — DEXAMETHASONE SODIUM PHOSPHATE 4 MG/ML
INJECTION, SOLUTION INTRA-ARTICULAR; INTRALESIONAL; INTRAMUSCULAR; INTRAVENOUS; SOFT TISSUE AS NEEDED
Status: DISCONTINUED | OUTPATIENT
Start: 2024-03-15 | End: 2024-03-15 | Stop reason: SURG

## 2024-03-15 RX ORDER — ROCURONIUM BROMIDE 10 MG/ML
INJECTION, SOLUTION INTRAVENOUS AS NEEDED
Status: DISCONTINUED | OUTPATIENT
Start: 2024-03-15 | End: 2024-03-15 | Stop reason: SURG

## 2024-03-15 RX ORDER — PROMETHAZINE HYDROCHLORIDE 25 MG/1
25 TABLET ORAL ONCE AS NEEDED
Status: DISCONTINUED | OUTPATIENT
Start: 2024-03-15 | End: 2024-03-15 | Stop reason: HOSPADM

## 2024-03-15 RX ORDER — ONDANSETRON 2 MG/ML
4 INJECTION INTRAMUSCULAR; INTRAVENOUS ONCE AS NEEDED
Status: DISCONTINUED | OUTPATIENT
Start: 2024-03-15 | End: 2024-03-15 | Stop reason: HOSPADM

## 2024-03-15 RX ORDER — EPHEDRINE SULFATE 50 MG/ML
5 INJECTION, SOLUTION INTRAVENOUS ONCE AS NEEDED
Status: DISCONTINUED | OUTPATIENT
Start: 2024-03-15 | End: 2024-03-15 | Stop reason: HOSPADM

## 2024-03-15 RX ORDER — SODIUM CHLORIDE, SODIUM LACTATE, POTASSIUM CHLORIDE, CALCIUM CHLORIDE 600; 310; 30; 20 MG/100ML; MG/100ML; MG/100ML; MG/100ML
9 INJECTION, SOLUTION INTRAVENOUS CONTINUOUS
Status: DISCONTINUED | OUTPATIENT
Start: 2024-03-15 | End: 2024-03-16 | Stop reason: HOSPADM

## 2024-03-15 RX ORDER — PREGABALIN 75 MG/1
75 CAPSULE ORAL ONCE
Status: COMPLETED | OUTPATIENT
Start: 2024-03-15 | End: 2024-03-15

## 2024-03-15 RX ORDER — SODIUM CHLORIDE 0.9 % (FLUSH) 0.9 %
3 SYRINGE (ML) INJECTION EVERY 12 HOURS SCHEDULED
Status: DISCONTINUED | OUTPATIENT
Start: 2024-03-15 | End: 2024-03-15 | Stop reason: HOSPADM

## 2024-03-15 RX ORDER — PHENYLEPHRINE HYDROCHLORIDE 10 MG/ML
INJECTION INTRAVENOUS AS NEEDED
Status: DISCONTINUED | OUTPATIENT
Start: 2024-03-15 | End: 2024-03-15 | Stop reason: SURG

## 2024-03-15 RX ORDER — PANTOPRAZOLE SODIUM 40 MG/1
40 TABLET, DELAYED RELEASE ORAL
Status: DISCONTINUED | OUTPATIENT
Start: 2024-03-16 | End: 2024-03-16 | Stop reason: HOSPADM

## 2024-03-15 RX ORDER — ASPIRIN 81 MG/1
81 TABLET ORAL EVERY 12 HOURS SCHEDULED
Status: DISCONTINUED | OUTPATIENT
Start: 2024-03-15 | End: 2024-03-16 | Stop reason: HOSPADM

## 2024-03-15 RX ORDER — FENTANYL CITRATE 50 UG/ML
25 INJECTION, SOLUTION INTRAMUSCULAR; INTRAVENOUS
Status: DISCONTINUED | OUTPATIENT
Start: 2024-03-15 | End: 2024-03-15 | Stop reason: HOSPADM

## 2024-03-15 RX ORDER — HYDROCODONE BITARTRATE AND ACETAMINOPHEN 7.5; 325 MG/1; MG/1
1 TABLET ORAL EVERY 4 HOURS PRN
Status: DISCONTINUED | OUTPATIENT
Start: 2024-03-15 | End: 2024-03-15 | Stop reason: HOSPADM

## 2024-03-15 RX ORDER — ONDANSETRON 2 MG/ML
4 INJECTION INTRAMUSCULAR; INTRAVENOUS EVERY 6 HOURS PRN
Status: DISCONTINUED | OUTPATIENT
Start: 2024-03-15 | End: 2024-03-16 | Stop reason: HOSPADM

## 2024-03-15 RX ORDER — UREA 10 %
1 LOTION (ML) TOPICAL NIGHTLY PRN
Status: DISCONTINUED | OUTPATIENT
Start: 2024-03-15 | End: 2024-03-16 | Stop reason: HOSPADM

## 2024-03-15 RX ORDER — LABETALOL HYDROCHLORIDE 5 MG/ML
5 INJECTION, SOLUTION INTRAVENOUS
Status: DISCONTINUED | OUTPATIENT
Start: 2024-03-15 | End: 2024-03-15 | Stop reason: HOSPADM

## 2024-03-15 RX ORDER — HYDROMORPHONE HYDROCHLORIDE 1 MG/ML
0.25 INJECTION, SOLUTION INTRAMUSCULAR; INTRAVENOUS; SUBCUTANEOUS
Status: DISCONTINUED | OUTPATIENT
Start: 2024-03-15 | End: 2024-03-15 | Stop reason: HOSPADM

## 2024-03-15 RX ORDER — MELOXICAM 15 MG/1
15 TABLET ORAL DAILY PRN
Status: DISCONTINUED | OUTPATIENT
Start: 2024-03-15 | End: 2024-03-16 | Stop reason: HOSPADM

## 2024-03-15 RX ORDER — NALOXONE HCL 0.4 MG/ML
0.2 VIAL (ML) INJECTION AS NEEDED
Status: DISCONTINUED | OUTPATIENT
Start: 2024-03-15 | End: 2024-03-15 | Stop reason: HOSPADM

## 2024-03-15 RX ORDER — ROPIVACAINE HYDROCHLORIDE 5 MG/ML
INJECTION, SOLUTION EPIDURAL; INFILTRATION; PERINEURAL
Status: COMPLETED | OUTPATIENT
Start: 2024-03-15 | End: 2024-03-15

## 2024-03-15 RX ORDER — CHLORHEXIDINE GLUCONATE 500 MG/1
CLOTH TOPICAL 2 TIMES DAILY
Status: DISCONTINUED | OUTPATIENT
Start: 2024-03-15 | End: 2024-03-16 | Stop reason: HOSPADM

## 2024-03-15 RX ORDER — ACETAMINOPHEN 10 MG/ML
1000 INJECTION, SOLUTION INTRAVENOUS ONCE
Status: COMPLETED | OUTPATIENT
Start: 2024-03-15 | End: 2024-03-15

## 2024-03-15 RX ORDER — HYDROMORPHONE HYDROCHLORIDE 1 MG/ML
0.5 INJECTION, SOLUTION INTRAMUSCULAR; INTRAVENOUS; SUBCUTANEOUS
Status: DISCONTINUED | OUTPATIENT
Start: 2024-03-15 | End: 2024-03-16 | Stop reason: HOSPADM

## 2024-03-15 RX ORDER — DIPHENHYDRAMINE HYDROCHLORIDE 50 MG/ML
12.5 INJECTION INTRAMUSCULAR; INTRAVENOUS
Status: DISCONTINUED | OUTPATIENT
Start: 2024-03-15 | End: 2024-03-15 | Stop reason: HOSPADM

## 2024-03-15 RX ORDER — DOCUSATE SODIUM 100 MG/1
100 CAPSULE, LIQUID FILLED ORAL 2 TIMES DAILY
Status: DISCONTINUED | OUTPATIENT
Start: 2024-03-15 | End: 2024-03-16 | Stop reason: HOSPADM

## 2024-03-15 RX ORDER — ONDANSETRON 2 MG/ML
INJECTION INTRAMUSCULAR; INTRAVENOUS AS NEEDED
Status: DISCONTINUED | OUTPATIENT
Start: 2024-03-15 | End: 2024-03-15 | Stop reason: SURG

## 2024-03-15 RX ORDER — LIDOCAINE HYDROCHLORIDE 10 MG/ML
0.5 INJECTION, SOLUTION INFILTRATION; PERINEURAL ONCE AS NEEDED
Status: DISCONTINUED | OUTPATIENT
Start: 2024-03-15 | End: 2024-03-15 | Stop reason: HOSPADM

## 2024-03-15 RX ADMIN — DEXAMETHASONE SODIUM PHOSPHATE 4 MG: 4 INJECTION, SOLUTION INTRA-ARTICULAR; INTRALESIONAL; INTRAMUSCULAR; INTRAVENOUS; SOFT TISSUE at 12:34

## 2024-03-15 RX ADMIN — MIDAZOLAM 2 MG: 1 INJECTION INTRAMUSCULAR; INTRAVENOUS at 12:26

## 2024-03-15 RX ADMIN — SODIUM CHLORIDE, POTASSIUM CHLORIDE, SODIUM LACTATE AND CALCIUM CHLORIDE: 600; 310; 30; 20 INJECTION, SOLUTION INTRAVENOUS at 13:26

## 2024-03-15 RX ADMIN — SUGAMMADEX 200 MG: 100 INJECTION, SOLUTION INTRAVENOUS at 14:51

## 2024-03-15 RX ADMIN — ASPIRIN 81 MG: 81 TABLET, COATED ORAL at 20:08

## 2024-03-15 RX ADMIN — ACETAMINOPHEN 1000 MG: 1000 INJECTION INTRAVENOUS at 13:47

## 2024-03-15 RX ADMIN — FENTANYL CITRATE 25 MCG: 50 INJECTION, SOLUTION INTRAMUSCULAR; INTRAVENOUS at 13:58

## 2024-03-15 RX ADMIN — HYDROMORPHONE HYDROCHLORIDE 0.5 MG: 1 INJECTION, SOLUTION INTRAMUSCULAR; INTRAVENOUS; SUBCUTANEOUS at 15:00

## 2024-03-15 RX ADMIN — PROPOFOL 200 MG: 10 INJECTION, EMULSION INTRAVENOUS at 13:34

## 2024-03-15 RX ADMIN — GLYCOPYRROLATE 0.2 MG: 0.2 INJECTION INTRAMUSCULAR; INTRAVENOUS at 13:34

## 2024-03-15 RX ADMIN — ONDANSETRON 4 MG: 2 INJECTION INTRAMUSCULAR; INTRAVENOUS at 14:29

## 2024-03-15 RX ADMIN — SODIUM CHLORIDE 2000 MG: 900 INJECTION INTRAVENOUS at 20:38

## 2024-03-15 RX ADMIN — ROPIVACAINE HYDROCHLORIDE 15 ML: 5 INJECTION EPIDURAL; INFILTRATION; PERINEURAL at 12:34

## 2024-03-15 RX ADMIN — HYDROMORPHONE HYDROCHLORIDE 0.5 MG: 1 INJECTION, SOLUTION INTRAMUSCULAR; INTRAVENOUS; SUBCUTANEOUS at 14:01

## 2024-03-15 RX ADMIN — DOCUSATE SODIUM 100 MG: 100 CAPSULE, LIQUID FILLED ORAL at 20:08

## 2024-03-15 RX ADMIN — SODIUM CHLORIDE 2 G: 900 INJECTION INTRAVENOUS at 13:24

## 2024-03-15 RX ADMIN — CHLORHEXIDINE GLUCONATE: 500 CLOTH TOPICAL at 12:41

## 2024-03-15 RX ADMIN — FENTANYL CITRATE 25 MCG: 50 INJECTION, SOLUTION INTRAMUSCULAR; INTRAVENOUS at 13:55

## 2024-03-15 RX ADMIN — VANCOMYCIN HYDROCHLORIDE 1250 MG: 1.25 INJECTION, POWDER, LYOPHILIZED, FOR SOLUTION INTRAVENOUS at 12:17

## 2024-03-15 RX ADMIN — SODIUM CHLORIDE 100 ML/HR: 9 INJECTION, SOLUTION INTRAVENOUS at 18:45

## 2024-03-15 RX ADMIN — HYDROMORPHONE HYDROCHLORIDE 0.5 MG: 1 INJECTION, SOLUTION INTRAMUSCULAR; INTRAVENOUS; SUBCUTANEOUS at 20:08

## 2024-03-15 RX ADMIN — PREGABALIN 75 MG: 75 CAPSULE ORAL at 12:15

## 2024-03-15 RX ADMIN — PHENYLEPHRINE HYDROCHLORIDE 100 MCG: 10 INJECTION INTRAVENOUS at 13:52

## 2024-03-15 RX ADMIN — ROCURONIUM BROMIDE 40 MG: 10 INJECTION, SOLUTION INTRAVENOUS at 13:34

## 2024-03-15 RX ADMIN — TRANEXAMIC ACID 1000 MG: 100 INJECTION INTRAVENOUS at 13:47

## 2024-03-15 RX ADMIN — DEXAMETHASONE SODIUM PHOSPHATE 8 MG: 4 INJECTION, SOLUTION INTRA-ARTICULAR; INTRALESIONAL; INTRAMUSCULAR; INTRAVENOUS; SOFT TISSUE at 13:39

## 2024-03-15 RX ADMIN — LIDOCAINE HYDROCHLORIDE 100 MG: 20 INJECTION, SOLUTION INFILTRATION; PERINEURAL at 13:34

## 2024-03-15 NOTE — ANESTHESIA POSTPROCEDURE EVALUATION
Patient: Sabra Cabrera    Procedure Summary       Date: 03/15/24 Room / Location:  [facility] OR 03 /  [facility]    Anesthesia Start: 1326 Anesthesia Stop: 1547    Procedure: TOTAL KNEE ARTHROPLASTY (Right: Knee) Diagnosis:       Primary osteoarthritis of right knee      (Primary osteoarthritis of right knee [M17.11])    Surgeons: Ajay Bridges MD Provider: Troy Aguilar MD    Anesthesia Type: general with block ASA Status: 3            Anesthesia Type: general with block    Vitals  Vitals Value Taken Time   /89 03/15/24 1545   Temp     Pulse 92 03/15/24 1546   Resp     SpO2 94 % 03/15/24 1546   Vitals shown include unfiled device data.        Post Anesthesia Care and Evaluation    Patient location during evaluation: bedside  Patient participation: complete - patient cannot participate  Level of consciousness: sleepy but conscious  Pain management: adequate    Airway patency: patent  Anesthetic complications: No anesthetic complications  PONV Status: controlled  Cardiovascular status: acceptable  Respiratory status: acceptable  Hydration status: acceptable

## 2024-03-15 NOTE — ANESTHESIA PREPROCEDURE EVALUATION
Anesthesia Evaluation     Patient summary reviewed and Nursing notes reviewed   no history of anesthetic complications:   NPO Solid Status: > 8 hours  NPO Liquid Status: > 2 hours           Airway   Mallampati: II  TM distance: >3 FB  Neck ROM: full  Dental      Pulmonary    Cardiovascular     (+) hypertension      Neuro/Psych  GI/Hepatic/Renal/Endo    (+) obesity, GERD    Musculoskeletal     Abdominal   (+) obese   Substance History      OB/GYN          Other   arthritis,                 Anesthesia Plan    ASA 3     general with block     intravenous induction     Anesthetic plan, risks, benefits, and alternatives have been provided, discussed and informed consent has been obtained with: patient.    CODE STATUS:

## 2024-03-15 NOTE — ANESTHESIA PROCEDURE NOTES
Airway  Urgency: elective    Date/Time: 3/15/2024 1:37 PM  Airway not difficult    General Information and Staff    Patient location during procedure: OR  Anesthesiologist: Troy Aguilar MD  CRNA/CAA: Ava Stringer CRNA    Indications and Patient Condition  Indications for airway management: airway protection    Preoxygenated: yes  Mask difficulty assessment: 2 - vent by mask + OA or adjuvant +/- NMBA    Final Airway Details  Final airway type: endotracheal airway      Successful airway: ETT  Cuffed: yes   Successful intubation technique: direct laryngoscopy  Facilitating devices/methods: intubating stylet  Endotracheal tube insertion site: oral  Blade: Tripp  Blade size: 3  ETT size (mm): 7.0  Cormack-Lehane Classification: grade I - full view of glottis  Placement verified by: chest auscultation and capnometry   Measured from: lips  ETT/EBT  to lips (cm): 20  Number of attempts at approach: 1  Assessment: lips, teeth, and gum same as pre-op and atraumatic intubation

## 2024-03-15 NOTE — DISCHARGE PLACEMENT REQUEST
"Rolando Short \"BEBETO\" (68 y.o. Female)       Date of Birth   1955    Social Security Number       Address   [address] [place] [id number]    Home Phone   [phone]    MRN   [id number]       Scientology   Non-Cheondoism    Marital Status                               Admission Date   3/15/24    Admission Type   Elective    Admitting Provider   Ajay Bridges MD    Attending Provider   Ajay Bridges MD    Department, Room/Bed   [facility] OR, [facility] OR       Discharge Date       Discharge Disposition       Discharge Destination                                 Attending Provider: Ajay Bridges MD    Allergies: No Known Allergies    Isolation: None   Infection: None   Code Status: Prior    Ht: 160 cm (63\")   Wt: 89 kg (196 lb 1.6 oz)    Admission Cmt: None   Principal Problem: Osteoarthritis of knee [M17.9]                   Active Insurance as of 3/15/2024       Primary Coverage       Payor Plan Insurance Group Employer/Plan Group    MEDICARE MEDICARE A & B        Payor Plan Address Payor Plan Phone Number Payor Plan Fax Number Effective Dates    [address] [phone]  11/1/2021 - None Entered    [facility] [address]         Subscriber Name Subscriber Birth Date Member ID       ROLANDO SHORT 1955 [id number]               Secondary Coverage       Payor Plan Insurance Group Employer/Plan Group     FOR LIFE  FOR LIFE MC SUP  NGN       Payor Plan Address Payor Plan Phone Number Payor Plan Fax Number Effective Dates    [address] [phone]  1/27/2024 - None Entered    [facility] [address]         Subscriber Name Subscriber Birth Date Member ID       DEJARIANNA JACOBS 5/27/1962 [id number]                     Emergency Contacts        (Rel.) Home Phone Work Phone Mobile Phone    DYLON SHORT (Spouse) [phone] -- [phone]                "

## 2024-03-15 NOTE — CONSULTS
CONSULT NOTE    INTERNAL MEDICINE   [facility]       Patient Identification:  Name: Sabra Cabrera  Age: 68 y.o.  Sex: female  :  1955  MRN: [id number]             Date of Consultation:  03/15/24          Primary Care Physician: Gloria Fleming APRN                               Requesting Physician: dr bridges  Reason for Consultation:medical management    Chief Complaint:  68 year old female was admitted after a knee replacement by dr bridges; she has a history of hypertension, gerd and obesity; she is doing well postop; no visitors are present    History of Present Illness:   As above      Past Medical History:  Past Medical History:   Diagnosis Date    Arthritis     GERD (gastroesophageal reflux disease)     History of mononucleosis     Knee meniscus pain     right knee    Tinnitus      Past Surgical History:  Past Surgical History:   Procedure Laterality Date    BACK SURGERY      l 5 herniated disc    BREAST AUGMENTATION Bilateral     bilateral breasts implants    COLONOSCOPY      ENDOSCOPY      KNEE ARTHROSCOPY Left     meniscus    LAPAROSCOPIC TUBAL LIGATION      TONSILLECTOMY      TOTAL KNEE ARTHROPLASTY Left 2020    Procedure: TOTAL KNEE ARTHROPLASTY;  Surgeon: Ajay Bridges MD;  Location: [facility];  Service: Orthopedics;  Laterality: Left;      Home Meds:  Medications Prior to Admission   Medication Sig Dispense Refill Last Dose    atorvastatin (LIPITOR) 20 MG tablet Take 1 tablet by mouth Daily.   3/14/2024    omeprazole (priLOSEC) 20 MG capsule Take 1 capsule by mouth Daily.   3/15/2024 at 0800    meloxicam (MOBIC) 15 MG tablet Take 1 tablet by mouth Daily As Needed for Mild Pain . (Patient taking differently: Take 1 tablet by mouth Daily As Needed for Mild Pain. HELD FOR SURGERY) 30 tablet 2 3/1/2024     Current Meds:     Current Facility-Administered Medications:     aspirin EC tablet 81 mg, 81 mg, Oral, Q12H, Ajay Bridges MD     [START ON 3/16/2024] bisacodyl (DULCOLAX) EC tablet 10 mg, 10 mg, Oral, Daily PRN, Ajay Bridges MD    ceFAZolin 2000 mg IVPB in 100 mL NS (MBP), 2,000 mg, Intravenous, Q8H, Ajay Bridges MD    Chlorhexidine Gluconate Cloth 2 % pads, , Apply externally, BID, Ajay Bridges MD    docusate sodium (COLACE) capsule 100 mg, 100 mg, Oral, BID, Ajay Bridges MD    HYDROcodone-acetaminophen (NORCO) 7.5-325 MG per tablet 1 tablet, 1 tablet, Oral, Q4H PRN, Ajay Bridges MD    HYDROcodone-acetaminophen (NORCO) 7.5-325 MG per tablet 2 tablet, 2 tablet, Oral, Q4H PRN, Ajay Bridges MD    HYDROmorphone (DILAUDID) injection 0.5 mg, 0.5 mg, Intravenous, Q2H PRN **AND** naloxone (NARCAN) injection 0.1 mg, 0.1 mg, Intravenous, Q5 Min PRN, Ajay Bridges MD    lactated ringers infusion, 9 mL/hr, Intravenous, Continuous, Leroy Chino MD, New Bag at 03/15/24 1326    melatonin tablet 1 mg, 1 mg, Oral, Nightly PRN, Ajay Bridges MD    meloxicam (MOBIC) tablet 15 mg, 15 mg, Oral, Daily PRN, Ajay Bridges MD    ondansetron ODT (ZOFRAN-ODT) disintegrating tablet 4 mg, 4 mg, Oral, Q6H PRN **OR** ondansetron (ZOFRAN) injection 4 mg, 4 mg, Intravenous, Q6H PRN, Ajay Bridges MD    [START ON 3/16/2024] pantoprazole (PROTONIX) EC tablet 40 mg, 40 mg, Oral, Q AM, Ajay Bridges MD    sodium chloride 0.9 % infusion, 100 mL/hr, Intravenous, Continuous, Ajay Bridges MD, Last Rate: 100 mL/hr at 03/15/24 1845, 100 mL/hr at 03/15/24 1845  Allergies:  No Known Allergies  Social History:   Social History     Socioeconomic History    Marital status:    Tobacco Use    Smoking status: Never    Smokeless tobacco: Never   Vaping Use    Vaping status: Never Used   Substance and Sexual Activity    Alcohol use: Not Currently     Comment: couple times a year    Drug use: Never    Sexual activity: Defer     Family  "History:  Family History   Problem Relation Age of Onset    Malig Hyperthermia Neg Hx           Review of Systems  See history of present illness and past medical history.  Patient denies headache, dizziness, syncope, falls, trauma, change in vision, change in hearing, change in taste, changes in weight, changes in appetite, focal weakness, numbness, or paresthesia.  Patient denies chest pain, palpitations, dyspnea, orthopnea, PND, cough, sinus pressure, rhinorrhea, epistaxis, hemoptysis, nausea, vomiting,hematemesis, diarrhea, constipation or hematochezia. Denies cold or heat intolerance, polydipsia, polyuria, polyphagia. Denies hematuria, pyuria, dysuria, hesitancy, frequency or urgency. Denies consumption of raw and under cooked meats foods or change in water source.          Vitals:   /86 (BP Location: Left arm, Patient Position: Lying)   Pulse 81   Temp 98.2 °F (36.8 °C) (Oral)   Resp 16   Ht 160 cm (62.99\")   Wt 89 kg (196 lb 1.6 oz)   SpO2 94%   BMI 34.75 kg/m²   I/O:   Intake/Output Summary (Last 24 hours) at 3/15/2024 1940  Last data filed at 3/15/2024 1727  Gross per 24 hour   Intake 1020 ml   Output 100 ml   Net 920 ml     Exam:  General Appearance:    Alert, cooperative, no distress, appears stated age   Head:    Normocephalic, without obvious abnormality, atraumatic   Eyes:    PERRL, conjunctivae/corneas clear, EOM's intact, both eyes   Ears:    Normal external ear canals, both ears   Nose:   Nares normal, septum midline, mucosa normal, no drainage    or sinus tenderness   Throat:   Lips, tongue, gums normal; oral mucosa pink and moist   Neck:   Supple, symmetrical, trachea midline, no adenopathy;     thyroid:  no enlargement/tenderness/nodules; no carotid    bruit or JVD   Back:     Symmetric, no curvature, ROM normal, no CVA tenderness   Lungs:     Decreased breath sounds bilaterally, respirations unlabored   Chest Wall:    No tenderness or deformity    Heart:    Regular rate and rhythm, " "S1 and S2 normal, no murmur, rub   or gallop   Abdomen:     Soft, nontender, bowel sounds active all four quadrants,     no masses, no hepatomegaly, no splenomegaly   Extremities:   Extremities normal, atraumatic, no cyanosis or edema                Data Review:  Labs in chart were reviewed.  No results found for: \"WBC\", \"HGB\", \"HCT\", \"PLT\"  No results found for: \"NA\", \"K\", \"CL\", \"CO2\", \"BUN\", \"CREATININE\", \"GLUCOSE\"  No results found for: \"CALCIUM\", \"MG\", \"PHOS\"            Imaging Results (Last 7 Days)       Procedure Component Value Units Date/Time    XR Knee 1 or 2 View Right [[id number]] Collected: 03/15/24 1614     Updated: 03/15/24 1617    Narrative:      PORTABLE JOINT X-RAY     HISTORY: Right knee arthroplasty.     Portable x-ray of the right knee is provided.     FINDINGS:  There is arthroplasty hardware, positioned as expected.  No  periprosthetic fracture is identified.  There are expected post  operative   changes in the soft tissues.       Impression:      Right knee arthroplasty as expected.     This report was finalized on 3/15/2024 4:14 PM by Dr. Alan Hwang M.D on Workstation: [id number]             Past Medical History:   Diagnosis Date    Arthritis     GERD (gastroesophageal reflux disease)     History of mononucleosis     Knee meniscus pain     right knee    Tinnitus        Assessment:  Active Hospital Problems    Diagnosis  POA    S/P TKR (total knee replacement) using cement, right [Z96.651]  Not Applicable      Resolved Hospital Problems    Diagnosis Date Resolved POA    **Osteoarthritis of knee [M17.9] 03/15/2024 Unknown   Hypertension  Gerd  Obesity   hyperglycemia    Plan:  Will monitor bp on her home medications  Trend labs  Will follow with you  Thanks for involving us in her care    Sierra Rico MD   3/15/2024  19:40 EDT    "

## 2024-03-15 NOTE — ANESTHESIA PROCEDURE NOTES
Peripheral Block    Pre-sedation assessment completed: 3/15/2024 12:15 PM    Patient reassessed immediately prior to procedure    Patient location during procedure: pre-op  Start time: 3/15/2024 12:30 PM  Stop time: 3/15/2024 12:34 PM  Reason for block: at surgeon's request  Performed by  Anesthesiologist: Leroy Chino MD  Preanesthetic Checklist  Completed: patient identified, IV checked, site marked, risks and benefits discussed, surgical consent, monitors and equipment checked, pre-op evaluation and timeout performed  Prep:  Pt Position: supine  Sterile barriers:cap, mask and washed/disinfected hands  Prep: ChloraPrep  Patient monitoring: blood pressure monitoring, continuous pulse oximetry and EKG  Procedure    Sedation: yes  Performed under: local infiltration  Guidance:ultrasound guided    ULTRASOUND INTERPRETATION.  Using ultrasound guidance a gauge needle was placed in close proximity to the femoral nerve, at which point, under ultrasound guidance anesthetic was injected in the area of the nerve and spread of the anesthesia was seen on ultrasound in close proximity thereto.  There were no abnormalities seen on ultrasound; a digital image was taken; and the patient tolerated the procedure with no complications. Images:still images obtained, printed/placed on chart    Laterality:right  Block Type:adductor canal block  Injection Technique:single-shot  Needle Type:echogenic  Needle Gauge:21 G  Resistance on Injection: none    Medications Used: dexamethasone (DECADRON) injection - Injection   4 mg - 3/15/2024 12:34:00 PM  ropivacaine (NAROPIN) 0.5 % injection - Injection   15 mL - 3/15/2024 12:34:00 PM      Post Assessment  Injection Assessment: negative aspiration for heme, no paresthesia on injection and incremental injection  Patient Tolerance:comfortable throughout block  Complications:no  Additional Notes  Ultrasound guidance used to visualize nerve anatomy, guide needle placement and verify local  anesthetic disbursement.

## 2024-03-15 NOTE — CASE MANAGEMENT/SOCIAL WORK
Continued Stay Note  [facility]     Patient Name: Sabra Cabrera  MRN: [id number]  Today's Date: 3/15/2024    Admit Date: 3/15/2024    Plan: [facility] has accepted and will follow at dc   Discharge Plan       Row Name 03/15/24 1501       Plan    Plan [facility] has accepted and will follow at dc                   Discharge Codes    No documentation.                       Sujatha Guerra RN

## 2024-03-15 NOTE — PLAN OF CARE
Goal Outcome Evaluation:   Patient is POD 0. Aox4. VSS on RA. Ambulates assist x1 with a walker. Dressing over incision is CDI. Currently DTV. Pain is adequately controlled. All needs currently met, will continue to monitor.

## 2024-03-15 NOTE — OP NOTE
Patient: Sabra Cabrera  YOB: 1955  Medical Record Number: [id number]  Attending Physician: Ajay Bridges,*  Primary Care Physician: Gloria Fleming APRN    Date of Service: 3/15/2024    Surgeon: Ajay Bridges MD        DATE OF PROCEDURE: 3/15/2024    Pre-op Diagnosis:   Primary osteoarthritis of right knee [M17.11]    Post-Op Diagnosis Codes:     * Primary osteoarthritis of right knee [M17.11]    PROCEDURE PERFORMED: RIGHT TOTAL KNEE ARTHROPLASTY by utilizing a   Medial parapatellar Approach     The tibia , femur and the patella were cemented in utilizing Simplex  cement.     Patty Persona   Natural tibia cemented   fixed bearing base plate size # D,   Cruciate Retaining femur   Narrow size # 6,   Tibial-bearing insert E Highly cross linked poly Medial Congruent size # D, 10 mm thick   All poly patella size # 29, 8 mm thickness  (Patty).     Surgical Approach: Knee Medial Parapatellar      Implant Name Type Inv. Item Serial No.  Lot No. LRB No. Used Action   DEV CONTRL TISS STRATAFIX PDS PLS OS6 REV SZ1 18IN 45CM - [id number] Implant DEV CONTRL TISS STRATAFIX PDS PLS OS6 REV SZ1 18IN 45CM  [employer]  DIV OF J AND J [id number] Right 1 Implanted   DEV CONTRL TISS STRATAFIX SPIRAL MNCRYL UD 3/0 PLS 30CM - [id number] Implant DEV CONTRL TISS STRATAFIX SPIRAL MNCRYL UD 3/0 PLS 30CM  [employer]  DIV OF J AND J [id number] Right 1 Implanted   CMT BONE SIMPLEX/P FULL DOSE 10/PK - [id number] Implant CMT BONE SIMPLEX/P FULL DOSE 10/PK  TISHA JOSIANE [id number] Right 1 Implanted   CMT BONE SIMPLEX/P FULL DOSE 10/PK - [id number] Implant CMT BONE SIMPLEX/P FULL DOSE 10/PK  TISHA JOSIANE [id number] Right 1 Implanted   SCRW HEX PERSONA FML 2.5X25MM PK/2 - [id number] Implant SCRW HEX PERSONA FML 2.5X25MM PK/2  PATTY  INC [id number] Right 1 Implanted   COMP FEM/KN PERSONA CR CMT NRW SZ6 RT - [id number] Implant COMP FEM/KN PERSONA CR CMT NRW SZ6 RT  PATT[employer] INC [id number] Right 1  Implanted   STEM TIB/KN PERSONA CMT 5D SZD RT - [id number] Implant STEM TIB/KN PERSONA CMT 5D SZD RT  [employer] [id number] Right 1 Implanted   PAT KN PERSONA VE CRS/LNK CMT 8X29MM - [id number] Implant PAT KN PERSONA VE CRS/LNK CMT 8X29MM  [employer] [id number] Right 1 Implanted   ART/SRF KN PERSONA/VE MC EF 6TO7 10MM RT - [id number] Implant ART/SRF KN PERSONA/VE MC EF 6TO7 10MM RT  [employer] [id number] Right 1 Implanted       SURGEON: Ajay Bridges MD     ASSISTANT:  Ronak Herring MD, Fellow    Edison DUGGAN          The services of a skilled  first assist were necessary for performing the procedure safely and expeditiously.  The first assist was present for the entire duration of the case and helped with positioning, retraction and closure of the incision.      ANESTHESIA: General with Block  Anesthesiologist: Troy Aguilar MD  CRNA: Ava Stringer CRNA  General anaesthesia with a regional adductor  Canal block  and intraoperative periarticular  Exparel and marcaine injection.    Estimated Blood Loss: 100 mL    Specimens: * No orders in the log *    COMPLICATIONS: Nil.     DRAINS: * No LDAs found *    INDICATIONS: The patient is a 68 y.o. female  who is known to me from progressively  worsening  knee pain  over the past several months. The patient has reached the point of disability and is having difficulty with activities of daily living including, but not limited to: difficulty getting up from a chair, difficulty ambulating distances, difficulty with stairs, and complains of night pain . Having failed nonoperative management, treatment options and alternatives were discussed in detail with the patient who is indicated for a total knee arthroplasty.     Likely risks and benefits of the procedure, including but not limited to infection, DVT, pulmonary embolism, future loosening of the implants, possibility of injury to tendons, ligaments, nerves or vessels, possibility of numbness, foot drop  and periprosthetic fractures have been discussed in detail. Despite the risks involved, the patient elected to proceed and informed consent was obtained and the patient was scheduled for surgery. The patient was seen in the preoperative holding area and the operative site was marked.     DESCRIPTION OF PROCEDURE:   The patient was transferred to [facility] operating room. Preoperative antibiotics in the form of Vancomycin and Kefzol  intravenously was infused prior to the incision and prior to the tourniquet placement according to the SCIP protocol. A surgical time out was done with the team and the correct patient, surgical side and site were identified. After achieving adequate general anesthesia, a well-padded tourniquet was placed over the proximal aspect of the operative thigh. The operative leg was prepped and draped in the usual sterile fashion. Tourniquet was elevated to a pressure of 250 mmHg.     Trannexamic acid was given intravenously prior to incision.      A skin incision was made vertically oriented centering over the patella anteriorly. Skin and subcutaneous tissue were incised and a medial parapatellar approach was developed. There was a large effusion. The patella was subluxed and the knee was inspected.     The patient had a preoperative varus knee.    There was flexion contracture approximately 5degrees and varus deformity of 10degrees. The LCL was lax.   There was complete loss of articular cartilage on the medial distal femoral condyle with corresponding areas of loss of articular cartilage in the medial tibial plateau. There were osteophytes in the notch and also medial tibia.   There were extensive osteophytes around the patella and there was complete loss of articular cartilage in the trochlear groove of the femur and also corresponding areas of the patella.     A distal femoral cut was completed utilizing an intramedullary alignment adam resecting  9 mm of distal femur with 5  degrees of valgus. The distal femoral cut was found to be satisfactory.     Attention was then directed to the proximal tibia. Extramedullary alignment adam was utilized and medial tibial thickness of  4 mm stylus was utilized amounting to 7 mm on the lateral side. The proximal tibial cut was made with  3° posterior slope and neutral varus valgus. The proximal tibial cut was found to be satisfactory.     I evaluated the extension gap and it was found to be satisfactory with good alignment and good medial and lateral collateral ligament tension and balancing.  I next evaluated the flexion gap at 90 and this was found to be satisfactory.    Attention was again directed to the distal femur. The distal femur was sized maintaining a posterior referencing. This was done along with verification of  Carl’s line and epicondylar axis maintaining correct rotational alignment and a 4-in-1 cut was completed for the distal femur utilizing a size # 6 cutting block. Anterior, posterior and chamfer cuts were completed.     The PCL was  robust  intact and supple and it was planned to retain it.     Posterior osteophytes were resected using curved osteotomes from the distal femur. Medial and lateral meniscectomy was completed. Proximal tibia was sized and a trial reduction was performed. Reduction was found to be satisfactory with range of motion from 0° to 120° with the patella tracking well.      The patella measured 22 mm in thickness; 8 mm  of patella was resected and lug holes were drilled for an asymmetric patella  29 mm diameter, 9 mm thick. The trial button was found to be seated satisfactorily.    Having been satisfied with the trials, the bony surfaces were prepared for cementation after punching the proximal tibia, maintaining the correct rotational alignment.  Exparel was infiltrated into the posterior capsule medially and laterally. Followed by this, the tibial component was cemented into position followed by the  femoral component, followed by patella and  seating of the 10 mm thick trial liner.  Simplex cement was utilized ,  Excess cement was removed. The components were held without any movement. The rest of the Exparel was infiltrated into the periarticular tissue.     Again, range of motion was checked and the range was satisfactory from 0° to 120° with excellent stability throughout the range of motion. Good medial and lateral tissue tension, and soft tissue balancing. The patella was tracking well. The trial liner was replaced with a MC size D, 10  mm thick liner. Having been satisfied with this, the joint was thoroughly irrigated with saline. Soft tissue hemostasis was secured.     The sponge count and needle count was found to be correct. Arthrotomy was closed with Ethibond sutures followed by closure of the incision in layers with Vicryl sutures and monocryl. Sterile dressings were placed and the patient was transferred to the recovery room in stable condition.     The patient tolerated the procedure well and is being admitted for postoperative antibiotics according to the SCIP protocol for 2 more doses in the first twenty four hours.   Aspirin  will be started  on postoperative day # 0.  The patient will be mobilized in am with physical therapy.    I discussed the satisfactory performance of the procedure with the patient's family and discussed with them The postoperative management.     CPT CODE : 26030    Ajay Bridges M.D.    3/15/2024    CC: Gloria Fleming, APRN; MD Yuliana Jacobs, Ajay LUGO,*

## 2024-03-16 VITALS
RESPIRATION RATE: 18 BRPM | HEIGHT: 63 IN | DIASTOLIC BLOOD PRESSURE: 72 MMHG | BODY MASS INDEX: 34.75 KG/M2 | WEIGHT: 196.1 LBS | SYSTOLIC BLOOD PRESSURE: 152 MMHG | OXYGEN SATURATION: 98 % | TEMPERATURE: 98.5 F | HEART RATE: 62 BPM

## 2024-03-16 LAB
ANION GAP SERPL CALCULATED.3IONS-SCNC: 10.4 MMOL/L (ref 5–15)
BASOPHILS # BLD AUTO: 0.01 10*3/MM3 (ref 0–0.2)
BASOPHILS NFR BLD AUTO: 0.1 % (ref 0–1.5)
BUN SERPL-MCNC: 13 MG/DL (ref 8–23)
BUN/CREAT SERPL: 20.3 (ref 7–25)
CALCIUM SPEC-SCNC: 8.4 MG/DL (ref 8.6–10.5)
CHLORIDE SERPL-SCNC: 105 MMOL/L (ref 98–107)
CO2 SERPL-SCNC: 23.6 MMOL/L (ref 22–29)
CREAT SERPL-MCNC: 0.64 MG/DL (ref 0.57–1)
DEPRECATED RDW RBC AUTO: 39.1 FL (ref 37–54)
EGFRCR SERPLBLD CKD-EPI 2021: 97 ML/MIN/1.73
EOSINOPHIL # BLD AUTO: 0 10*3/MM3 (ref 0–0.4)
EOSINOPHIL NFR BLD AUTO: 0 % (ref 0.3–6.2)
ERYTHROCYTE [DISTWIDTH] IN BLOOD BY AUTOMATED COUNT: 12.5 % (ref 12.3–15.4)
GLUCOSE SERPL-MCNC: 143 MG/DL (ref 65–99)
HCT VFR BLD AUTO: 35.8 % (ref 34–46.6)
HGB BLD-MCNC: 11.7 G/DL (ref 12–15.9)
IMM GRANULOCYTES # BLD AUTO: 0.03 10*3/MM3 (ref 0–0.05)
IMM GRANULOCYTES NFR BLD AUTO: 0.2 % (ref 0–0.5)
LYMPHOCYTES # BLD AUTO: 0.71 10*3/MM3 (ref 0.7–3.1)
LYMPHOCYTES NFR BLD AUTO: 5.9 % (ref 19.6–45.3)
MCH RBC QN AUTO: 28.2 PG (ref 26.6–33)
MCHC RBC AUTO-ENTMCNC: 32.7 G/DL (ref 31.5–35.7)
MCV RBC AUTO: 86.3 FL (ref 79–97)
MONOCYTES # BLD AUTO: 0.76 10*3/MM3 (ref 0.1–0.9)
MONOCYTES NFR BLD AUTO: 6.3 % (ref 5–12)
NEUTROPHILS NFR BLD AUTO: 10.58 10*3/MM3 (ref 1.7–7)
NEUTROPHILS NFR BLD AUTO: 87.5 % (ref 42.7–76)
NRBC BLD AUTO-RTO: 0 /100 WBC (ref 0–0.2)
PLATELET # BLD AUTO: 269 10*3/MM3 (ref 140–450)
PMV BLD AUTO: 10.1 FL (ref 6–12)
POTASSIUM SERPL-SCNC: 4.3 MMOL/L (ref 3.5–5.2)
RBC # BLD AUTO: 4.15 10*6/MM3 (ref 3.77–5.28)
SODIUM SERPL-SCNC: 139 MMOL/L (ref 136–145)
WBC NRBC COR # BLD AUTO: 12.09 10*3/MM3 (ref 3.4–10.8)

## 2024-03-16 PROCEDURE — G0378 HOSPITAL OBSERVATION PER HR: HCPCS

## 2024-03-16 PROCEDURE — 25010000002 CEFAZOLIN PER 500 MG: Performed by: ORTHOPAEDIC SURGERY

## 2024-03-16 PROCEDURE — 85025 COMPLETE CBC W/AUTO DIFF WBC: CPT | Performed by: ORTHOPAEDIC SURGERY

## 2024-03-16 PROCEDURE — 97161 PT EVAL LOW COMPLEX 20 MIN: CPT | Performed by: PHYSICAL THERAPIST

## 2024-03-16 PROCEDURE — 97530 THERAPEUTIC ACTIVITIES: CPT | Performed by: PHYSICAL THERAPIST

## 2024-03-16 PROCEDURE — 80048 BASIC METABOLIC PNL TOTAL CA: CPT | Performed by: ORTHOPAEDIC SURGERY

## 2024-03-16 RX ORDER — GABAPENTIN 300 MG/1
300 CAPSULE ORAL 2 TIMES DAILY
Qty: 60 CAPSULE | Refills: 0 | Status: SHIPPED | OUTPATIENT
Start: 2024-03-16

## 2024-03-16 RX ORDER — PSEUDOEPHEDRINE HCL 30 MG
100 TABLET ORAL 2 TIMES DAILY
Qty: 31 CAPSULE | Refills: 0 | Status: SHIPPED | OUTPATIENT
Start: 2024-03-16 | End: 2024-03-16

## 2024-03-16 RX ORDER — GABAPENTIN 300 MG/1
300 CAPSULE ORAL 3 TIMES DAILY
Qty: 60 CAPSULE | Refills: 0 | Status: SHIPPED | OUTPATIENT
Start: 2024-03-16 | End: 2024-03-16 | Stop reason: HOSPADM

## 2024-03-16 RX ORDER — BISACODYL 5 MG/1
10 TABLET, DELAYED RELEASE ORAL DAILY PRN
Qty: 10 TABLET | Refills: 0 | Status: SHIPPED | OUTPATIENT
Start: 2024-03-16 | End: 2024-03-25

## 2024-03-16 RX ORDER — ONDANSETRON 4 MG/1
4 TABLET, ORALLY DISINTEGRATING ORAL EVERY 6 HOURS PRN
Qty: 30 TABLET | Refills: 0 | Status: SHIPPED | OUTPATIENT
Start: 2024-03-16 | End: 2024-03-16

## 2024-03-16 RX ORDER — ASPIRIN 81 MG/1
81 TABLET ORAL EVERY 12 HOURS SCHEDULED
Qty: 59 TABLET | Refills: 0 | Status: SHIPPED | OUTPATIENT
Start: 2024-03-16 | End: 2024-03-16

## 2024-03-16 RX ORDER — HYDROCODONE BITARTRATE AND ACETAMINOPHEN 7.5; 325 MG/1; MG/1
1 TABLET ORAL EVERY 4 HOURS PRN
Qty: 30 TABLET | Refills: 0 | Status: SHIPPED | OUTPATIENT
Start: 2024-03-16

## 2024-03-16 RX ORDER — ASPIRIN 81 MG/1
81 TABLET ORAL EVERY 12 HOURS SCHEDULED
Qty: 59 TABLET | Refills: 0 | Status: SHIPPED | OUTPATIENT
Start: 2024-03-16 | End: 2024-04-15

## 2024-03-16 RX ORDER — GABAPENTIN 300 MG/1
300 CAPSULE ORAL 3 TIMES DAILY
Qty: 90 CAPSULE | Refills: 0 | Status: SHIPPED | OUTPATIENT
Start: 2024-03-16 | End: 2024-03-16

## 2024-03-16 RX ORDER — BISACODYL 5 MG/1
10 TABLET, DELAYED RELEASE ORAL DAILY PRN
Qty: 10 TABLET | Refills: 0 | Status: SHIPPED | OUTPATIENT
Start: 2024-03-16 | End: 2024-03-16

## 2024-03-16 RX ORDER — PSEUDOEPHEDRINE HCL 30 MG
100 TABLET ORAL 2 TIMES DAILY
Qty: 31 CAPSULE | Refills: 0 | Status: SHIPPED | OUTPATIENT
Start: 2024-03-16 | End: 2024-04-01

## 2024-03-16 RX ORDER — HYDROCODONE BITARTRATE AND ACETAMINOPHEN 7.5; 325 MG/1; MG/1
1 TABLET ORAL EVERY 4 HOURS PRN
Qty: 30 TABLET | Refills: 0 | Status: SHIPPED | OUTPATIENT
Start: 2024-03-16 | End: 2024-03-25

## 2024-03-16 RX ORDER — HYDROCODONE BITARTRATE AND ACETAMINOPHEN 7.5; 325 MG/1; MG/1
1 TABLET ORAL EVERY 4 HOURS PRN
Qty: 30 TABLET | Refills: 0 | Status: SHIPPED | OUTPATIENT
Start: 2024-03-16 | End: 2024-03-16 | Stop reason: HOSPADM

## 2024-03-16 RX ORDER — ONDANSETRON 4 MG/1
4 TABLET, ORALLY DISINTEGRATING ORAL EVERY 6 HOURS PRN
Qty: 30 TABLET | Refills: 0 | Status: SHIPPED | OUTPATIENT
Start: 2024-03-16

## 2024-03-16 RX ADMIN — ASPIRIN 81 MG: 81 TABLET, COATED ORAL at 09:41

## 2024-03-16 RX ADMIN — PANTOPRAZOLE SODIUM 40 MG: 40 TABLET, DELAYED RELEASE ORAL at 05:06

## 2024-03-16 RX ADMIN — HYDROCODONE BITARTRATE AND ACETAMINOPHEN 1 TABLET: 7.5; 325 TABLET ORAL at 05:07

## 2024-03-16 RX ADMIN — HYDROCODONE BITARTRATE AND ACETAMINOPHEN 2 TABLET: 7.5; 325 TABLET ORAL at 09:42

## 2024-03-16 RX ADMIN — SODIUM CHLORIDE 2000 MG: 900 INJECTION INTRAVENOUS at 05:06

## 2024-03-16 RX ADMIN — DOCUSATE SODIUM 100 MG: 100 CAPSULE, LIQUID FILLED ORAL at 09:41

## 2024-03-16 RX ADMIN — HYDROCODONE BITARTRATE AND ACETAMINOPHEN 1 TABLET: 7.5; 325 TABLET ORAL at 01:32

## 2024-03-16 RX ADMIN — HYDROCODONE BITARTRATE AND ACETAMINOPHEN 2 TABLET: 7.5; 325 TABLET ORAL at 13:51

## 2024-03-16 NOTE — PLAN OF CARE
Problem: Neurovascular Compromise (Knee Arthroplasty)  Goal: Intact Neurovascular Status  Outcome: Ongoing, Progressing   Goal Outcome Evaluation:           Progress: improving  Outcome Evaluation: Pt appeared to sleep well, alert and oriented x4, coop with care, up to br with assist, pain managed with oral meds, 2l at hs for decreased o2 sats while sleeping, denies n/t, dressing d/i.

## 2024-03-16 NOTE — PROGRESS NOTES
"    Name: Sabra Cabrera ADMIT: 3/15/2024   : 1955  PCP: Gloria Fleming BETTIE, APRN    MRN: [id number] LOS: 0 days   AGE/SEX: 68 y.o. female  ROOM: [facility]   Subjective   No chief complaint on file.  Cc- knee pain    Pain fair  Better with meds  ambulating    ROS  No f/c  No n/v  No cp/palp  No soa/cough    Objective   Vital Signs  Temp:  [97.4 °F (36.3 °C)-98.6 °F (37 °C)] 98 °F (36.7 °C)  Heart Rate:  [62-93] 82  Resp:  [16] 16  BP: (115-154)/() 131/69  SpO2:  [88 %-100 %] 96 %  on  Flow (L/min):  [1.5-2] 1.5;   Device (Oxygen Therapy): nasal cannula  Body mass index is 34.75 kg/m².    Physical Exam  Constitutional:       General: She is not in acute distress.  HENT:      Head: Normocephalic and atraumatic.   Eyes:      General: No scleral icterus.  Cardiovascular:      Rate and Rhythm: Regular rhythm.      Heart sounds: Normal heart sounds.   Pulmonary:      Effort: Pulmonary effort is normal. No respiratory distress.   Abdominal:      General: There is no distension.      Palpations: Abdomen is soft.   Musculoskeletal:      Cervical back: Neck supple.   Neurological:      Mental Status: She is alert.   Psychiatric:         Behavior: Behavior normal.     Post op changes to knee    Results Review:       I reviewed the patient's new clinical results.  Results from last 7 days   Lab Units 24  0511   WBC 10*3/mm3 12.09*   HEMOGLOBIN g/dL 11.7*   PLATELETS 10*3/mm3 269     Results from last 7 days   Lab Units 24  0511   SODIUM mmol/L 139   POTASSIUM mmol/L 4.3   CHLORIDE mmol/L 105   CO2 mmol/L 23.6   BUN mg/dL 13   CREATININE mg/dL 0.64   GLUCOSE mg/dL 143*   Estimated Creatinine Clearance: 90.2 mL/min (by C-G formula based on SCr of 0.64 mg/dL).    Results from last 7 days   Lab Units 24  0511   CALCIUM mg/dL 8.4*         Coag     HbA1C No results found for: \"HGBA1C\"  Infection     Radiology(recent) XR Knee 1 or 2 View Right    Result Date: 3/15/2024  Right knee arthroplasty as " "expected.  This report was finalized on 3/15/2024 4:14 PM by Dr. Alan Hwang M.D on Workstation: [id number]     No results found for: \"TROPONINT\", \"TROPONINI\", \"BNP\"  No components found for: \"TSH;2\"    aspirin, 81 mg, Oral, Q12H  Chlorhexidine Gluconate Cloth, , Apply externally, BID  docusate sodium, 100 mg, Oral, BID  pantoprazole, 40 mg, Oral, Q AM      lactated ringers, 9 mL/hr  sodium chloride, 100 mL/hr, Last Rate: 100 mL/hr (03/15/24 1845)    Diet: Regular/House; Fluid Consistency: Thin (IDDSI 0)      Assessment & Plan      Active Hospital Problems    Diagnosis  POA    S/P TKR (total knee replacement) using cement, right [Z96.651]  Not Applicable    Hypertension [I10]  Yes    GERD (gastroesophageal reflux disease) [K21.9]  Yes      Resolved Hospital Problems    Diagnosis Date Resolved POA    **Osteoarthritis of knee [M17.9] 03/15/2024 Yes       PRN agents for pain control, plans for PT  On ASA for dvt proph as recommended by Orthopedics  Stool softener  PPI  HTN listed in chart but on no HTN meds on home list, monitor BP while here  Renal function normal       DW family  Ok to dc from medical perspective      Eric Lopez MD  [facility]  03/16/24  07:35 EDT  "

## 2024-03-16 NOTE — CASE MANAGEMENT/SOCIAL WORK
Case Management Discharge Note      Final Note: dc home with [facility]         Selected Continued Care - Discharged on 3/16/2024 Admission date: 3/15/2024 - Discharge disposition: Home or Self Care      Destination    No services have been selected for the patient.                Durable Medical Equipment    No services have been selected for the patient.                Dialysis/Infusion    No services have been selected for the patient.                Home Medical Care Coordination complete.      Service Provider Selected Services Address Phone Fax Patient Preferred    [facility] [address] [place] [phone] --              Therapy    No services have been selected for the patient.                Community Resources    No services have been selected for the patient.                Community & DME    No services have been selected for the patient.                         Final Discharge Disposition Code: 06 - home with home health care

## 2024-03-16 NOTE — THERAPY EVALUATION
Patient Name: Sabra Cabrera  : 1955    MRN: [id number]                              Today's Date: 3/16/2024       Admit Date: 3/15/2024    Visit Dx:     ICD-10-CM ICD-9-CM   1. S/P TKR (total knee replacement) using cement, right  Z96.651 V43.65     Patient Active Problem List   Diagnosis    Status post left knee replacement    Hypertension    GERD (gastroesophageal reflux disease)    S/P TKR (total knee replacement) using cement, right     Past Medical History:   Diagnosis Date    Arthritis     GERD (gastroesophageal reflux disease)     History of mononucleosis     Knee meniscus pain     right knee    Tinnitus      Past Surgical History:   Procedure Laterality Date    BACK SURGERY      l 5 herniated disc    BREAST AUGMENTATION Bilateral     bilateral breasts implants    COLONOSCOPY      ENDOSCOPY      KNEE ARTHROSCOPY Left     meniscus    LAPAROSCOPIC TUBAL LIGATION      TONSILLECTOMY      TOTAL KNEE ARTHROPLASTY Left 2020    Procedure: TOTAL KNEE ARTHROPLASTY;  Surgeon: Ajay Bridges MD;  Location: [facility];  Service: Orthopedics;  Laterality: Left;      General Information       Row Name 24 1249          Physical Therapy Time and Intention    Document Type evaluation;discharge evaluation/summary  -     Mode of Treatment individual therapy;physical therapy  -       Row Name 24 1249          General Information    Patient Profile Reviewed yes  -     Prior Level of Function independent:;gait;transfer;w/c or scooter;bed mobility;ADL's  -     Existing Precautions/Restrictions fall  -     Barriers to Rehab none identified  -       Row Name 24 1249          Living Environment    People in Home spouse  -       Row Name 24 1249          Home Main Entrance    Number of Stairs, Main Entrance two  -LC       Row Name 24 1249          Cognition    Orientation Status (Cognition) oriented x 4  -LC       Row Name 24 1249          Safety Issues,  Functional Mobility    Impairments Affecting Function (Mobility) pain;endurance/activity tolerance;strength  -               User Key  (r) = Recorded By, (t) = Taken By, (c) = Cosigned By      Initials Name Provider Type    Scout Barnett, PT DPT Physical Therapist                   Mobility       Row Name 03/16/24 1249          Bed Mobility    Bed Mobility bed mobility (all) activities  -     All Activities, [place] (Bed Mobility) contact guard  -     Assistive Device (Bed Mobility) bed rails  -       Row Name 03/16/24 1249          Sit-Stand Transfer    Sit-Stand [place] (Transfers) standby assist  -     Assistive Device (Sit-Stand Transfers) walker, front-wheeled  -LC       Row Name 03/16/24 1249          Gait/Stairs (Locomotion)    Gait/Stairs Locomotion gait/ambulation independence  -     [place] Level (Gait) contact guard  -     Assistive Device (Gait) walker, front-wheeled  -LC     Patient was able to Ambulate yes  -     Distance in Feet (Gait) 70  -LC     Pattern (Gait) step-through  -LC     Deviations/Abnormal Patterns (Gait) antalgic;weight shifting decreased  -               User Key  (r) = Recorded By, (t) = Taken By, (c) = Cosigned By      Initials Name Provider Type    Scout Barnett, PT DPT Physical Therapist                   Obj/Interventions       Row Name 03/16/24 1250          Range of Motion Comprehensive    Comment, General Range of Motion R knee AROm 0 to 80  -       Row Name 03/16/24 1250          Strength Comprehensive (MMT)    Comment, General Manual Muscle Testing (MMT) Assessment RLE MMT grossly 3-/5  -LC               User Key  (r) = Recorded By, (t) = Taken By, (c) = Cosigned By      Initials Name Provider Type    Scout Barnett, PT DPT Physical Therapist                   Goals/Plan    No documentation.                  Clinical Impression       Row Name 03/16/24 1252          Pain    Pretreatment Pain Rating 5/10  -     Posttreatment  Pain Rating 5/10  -     Pain Location - Side/Orientation Right  -LC     Pain Location - knee  -LC     Pain Intervention(s) Medication (See MAR);Repositioned;Cold pack  -       Row Name 03/16/24 1252          Plan of Care Review    Plan of Care Reviewed With patient;spouse  -     Outcome Evaluation PT EVAL completed. Pt AO x 4. Pt reports pain in R knee increasing. Pt transferred supine to sit with min x 1. Pt transferred sit to stand with SBA x 1 and RW. pt ambulated 70 ft with RW and CGA x 1. Pt transferred sit to supine with min x 1. Pt is able to discharge home with assist and HH. Pt is showing good WBAT through RLE and good terminal knee extension during ambulation.  -       Row Name 03/16/24 1252          Therapy Assessment/Plan (PT)    Patient/Family Therapy Goals Statement (PT) home with assist and HH  -     Criteria for Skilled Interventions Met (PT) other (see comments)  -     Therapy Frequency (PT) evaluation only  -       Row Name 03/16/24 1252          Positioning and Restraints    Pre-Treatment Position in bed  -     Post Treatment Position bed  -LC     In Bed supine;notified nsg;call light within reach;encouraged to call for assist;with family/caregiver  -               User Key  (r) = Recorded By, (t) = Taken By, (c) = Cosigned By      Initials Name Provider Type    Scout Barnett, PT DPT Physical Therapist                   Outcome Measures       Row Name 03/16/24 1255 03/16/24 0942       How much help from another person do you currently need...    Turning from your back to your side while in flat bed without using bedrails? 3  -LC 3  -JM    Moving from lying on back to sitting on the side of a flat bed without bedrails? 3  -LC 3  -JM    Moving to and from a bed to a chair (including a wheelchair)? 3  -LC 3  -JM    Standing up from a chair using your arms (e.g., wheelchair, bedside chair)? 4  -LC 3  -JM    Climbing 3-5 steps with a railing? 3  -LC 3  -JM    To walk in hospital  room? 3  -LC 3  -JM    AM-PAC 6 Clicks Score (PT) 19  -LC 18  -JM    Highest Level of Mobility Goal 6 --> Walk 10 steps or more  - 6 --> Walk 10 steps or more  -      Row Name 03/16/24 0132 03/16/24 0100       How much help from another person do you currently need...    Turning from your back to your side while in flat bed without using bedrails? 3  -AP 3  -DS    Moving from lying on back to sitting on the side of a flat bed without bedrails? 3  -AP 3  -DS    Moving to and from a bed to a chair (including a wheelchair)? 3  -AP 3  -DS    Standing up from a chair using your arms (e.g., wheelchair, bedside chair)? 3  -AP 3  -DS    Climbing 3-5 steps with a railing? 3  -AP 3  -DS    To walk in hospital room? 3  -AP 3  -DS    AM-PAC 6 Clicks Score (PT) 18  -AP 18  -DS    Highest Level of Mobility Goal 6 --> Walk 10 steps or more  -AP 6 --> Walk 10 steps or more  -DS      Row Name 03/16/24 1255          Functional Assessment    Outcome Measure Options AM-PAC 6 Clicks Basic Mobility (PT)  -               User Key  (r) = Recorded By, (t) = Taken By, (c) = Cosigned By      Initials Name Provider Type    Chrystal Levin RN Registered Nurse    Scout Barnett, PT DPT Physical Therapist    Stacey Hendrix, RN Registered Nurse    Traci Phillips, RN Registered Nurse                                 Physical Therapy Education       Title: PT OT SLP Therapies (Done)       Topic: Physical Therapy (Done)       Point: Mobility training (Done)       Learning Progress Summary             Patient Acceptance, E,D, VU,DU by  at 3/16/2024 1255                         Point: Home exercise program (Done)       Learning Progress Summary             Patient Acceptance, E,D, VU,DU by  at 3/16/2024 1255                         Point: Body mechanics (Done)       Learning Progress Summary             Patient Acceptance, E,D, VU,DU by  at 3/16/2024 1255                         Point: Precautions (Done)       Learning  Progress Summary             Patient Acceptance, E,D, VU,DU by  at 3/16/2024 1255                                         User Key       Initials Effective Dates Name Provider Type Discipline     07/11/23 -  Scout Montana, PT DPT Physical Therapist PT                  PT Recommendation and Plan     Plan of Care Reviewed With: patient, spouse  Outcome Evaluation: PT EVAL completed. Pt AO x 4. Pt reports pain in R knee increasing. Pt transferred supine to sit with min x 1. Pt transferred sit to stand with SBA x 1 and RW. pt ambulated 70 ft with RW and CGA x 1. Pt transferred sit to supine with min x 1. Pt is able to discharge home with assist and HH. Pt is showing good WBAT through RLE and good terminal knee extension during ambulation.     Time Calculation:         PT Charges       Row Name 03/16/24 1256             Time Calculation    Start Time 1246  -      Stop Time 1309  -      Time Calculation (min) 23 min  -      PT Received On 03/16/24  -         Time Calculation- PT    Total Timed Code Minutes- PT 23 minute(s)  -         Timed Charges    91257 - PT Therapeutic Activity Minutes 15  -         Total Minutes    Timed Charges Total Minutes 15  -       Total Minutes 15  -                User Key  (r) = Recorded By, (t) = Taken By, (c) = Cosigned By      Initials Name Provider Type     Scout Montana, PT DPT Physical Therapist                  Therapy Charges for Today       Code Description Service Date Service Provider Modifiers Qty    [id number]  PT THERAPEUTIC ACT EA 15 MIN 3/16/2024 Scout Montana, PT DPT GP 1    [id number]  PT EVAL LOW COMPLEXITY 1 3/16/2024 Scout Montana, PT DPT GP 1            PT G-Codes  Outcome Measure Options: AM-PAC 6 Clicks Basic Mobility (PT)  AM-PAC 6 Clicks Score (PT): 19  PT Discharge Summary  Anticipated Discharge Disposition (PT): home with assist, home with home health    ESVIN BarryT  3/16/2024

## 2024-03-16 NOTE — PLAN OF CARE
Goal Outcome Evaluation:  Plan of Care Reviewed With: patient, spouse           Outcome Evaluation: PT EVAL completed. Pt AO x 4. Pt reports pain in R knee increasing. Pt transferred supine to sit with min x 1. Pt transferred sit to stand with SBA x 1 and RW. pt ambulated 70 ft with RW and CGA x 1. Pt transferred sit to supine with min x 1. Pt is able to discharge home with assist and HH. Pt is showing good WBAT through RLE and good terminal knee extension during ambulation.      Anticipated Discharge Disposition (PT): home with assist, home with home health

## 2024-03-16 NOTE — DISCHARGE SUMMARY
Orthopedic Discharge Summary      Patient: Sabra Cabrera   YOB: 1955    Medical Record Number: [id number]    Attending Physician: Ajay Bridges,*    Consulting Physician(s):   Consulting Physician(s)         Provider   Role Specialty     Eric Lopez MD      Consulting Physician Hospitalist            Date of Admission: 3/15/2024 10:38 AM   Date of Discharge:      Admitting Diagnosis: Primary osteoarthritis of right knee [M17.11]  Osteoarthritis of knee [M17.9]    Procedures Performed  Procedure(s):  TOTAL KNEE ARTHROPLASTY         Hypertension    GERD (gastroesophageal reflux disease)    S/P TKR (total knee replacement) using cement, right       No Known Allergies       Discharge Medications        New Medications        Instructions Start Date   aspirin 81 MG EC tablet   81 mg, Oral, Every 12 Hours Scheduled      bisacodyl 5 MG EC tablet  Commonly known as: DULCOLAX   10 mg, Oral, Daily PRN      docusate sodium 100 MG capsule   100 mg, Oral, 2 Times Daily      gabapentin 300 MG capsule  Commonly known as: Neurontin   300 mg, Oral, 3 Times Daily      HYDROcodone-acetaminophen 7.5-325 MG per tablet  Commonly known as: NORCO   1 tablet, Oral, Every 4 Hours PRN      ondansetron ODT 4 MG disintegrating tablet  Commonly known as: ZOFRAN-ODT   4 mg, Translingual, Every 6 Hours PRN             Continue These Medications        Instructions Start Date   atorvastatin 20 MG tablet  Commonly known as: LIPITOR   20 mg, Oral, Daily      meloxicam 15 MG tablet  Commonly known as: MOBIC   15 mg, Oral, Daily PRN      omeprazole 20 MG capsule  Commonly known as: priLOSEC   20 mg, Oral, Daily                  Past Medical History:   Diagnosis Date    Arthritis     GERD (gastroesophageal reflux disease)     History of mononucleosis     Knee meniscus pain     right knee    Tinnitus         Past Surgical History:   Procedure Laterality Date    BACK SURGERY      l 5 herniated disc    BREAST  "AUGMENTATION Bilateral     bilateral breasts implants    COLONOSCOPY      ENDOSCOPY      KNEE ARTHROSCOPY Left     meniscus    LAPAROSCOPIC TUBAL LIGATION      TONSILLECTOMY      TOTAL KNEE ARTHROPLASTY Left 2/4/2020    Procedure: TOTAL KNEE ARTHROPLASTY;  Surgeon: Ajay Bridges MD;  Location: [facility];  Service: Orthopedics;  Laterality: Left;        Social History     Occupational History    Not on file   Tobacco Use    Smoking status: Never    Smokeless tobacco: Never   Vaping Use    Vaping status: Never Used   Substance and Sexual Activity    Alcohol use: Not Currently     Comment: couple times a year    Drug use: Never    Sexual activity: Defer      Social History     Social History Narrative    Not on file        Family History   Problem Relation Age of Onset    Malig Hyperthermia Neg Hx        Physical Exam: 68 y.o. female  General Appearance:    Alert, cooperative, in no acute distress                      Vitals:    03/15/24 1727 03/15/24 2009 03/15/24 2313 03/16/24 0503   BP: 135/86 135/80 132/72 131/69   BP Location: Left arm Left arm Left arm Left arm   Patient Position: Lying Lying Lying Lying   Pulse: 81 62 71 82   Resp: 16 16 16 16   Temp: 98.2 °F (36.8 °C) 98.2 °F (36.8 °C) 97.4 °F (36.3 °C) 98 °F (36.7 °C)   TempSrc: Oral Oral Oral Oral   SpO2: 94% 96% (!) 88% 96%   Weight: 89 kg (196 lb 1.6 oz)      Height: 160 cm (62.99\")           Hospital Course:  68 y.o. female admitted to [facility] to services of Ajay Bridges,Edith with Osteoarthritis knee  on 3/15/2024 and underwent a RIGHT total knee arthroplasty Per Ajay Bridges MD. Antibiotic and VTE prophylaxis were per SCIP protocols and included  Kefzol  every 8 hours and Aspirin daily . Post-operatively the patient transferred to the post-operative floor where the patient underwent mobilization therapy that included active as well as passive ROM exercises. Opioids were titrated to achieve appropriate pain " management to allow for participation in mobilization exercises. Vital signs are now stable. The incision is intact without signs or symptoms of infection. Operative extremity neurovascular status remains intact.     Appropriate education re: incision care, activity levels, medications, and follow up visits was completed and all questions were answered. The patient is now deemed stable for discharge.      DISCHARGE DISPOSITION AND PLAN:  The  Patient is being discharged home with home health for PT  2-3 X per week for 2-3 weeks and nursing care as needed.     DIAGNOSTIC TESTS:     Admission on 03/15/2024   Component Date Value Ref Range Status    Glucose 03/16/2024 143 (H)  65 - 99 mg/dL Final    BUN 03/16/2024 13  8 - 23 mg/dL Final    Creatinine 03/16/2024 0.64  0.57 - 1.00 mg/dL Final    Sodium 03/16/2024 139  136 - 145 mmol/L Final    Potassium 03/16/2024 4.3  3.5 - 5.2 mmol/L Final    Slight hemolysis detected by analyzer. Result may be falsely elevated.    Chloride 03/16/2024 105  98 - 107 mmol/L Final    CO2 03/16/2024 23.6  22.0 - 29.0 mmol/L Final    Calcium 03/16/2024 8.4 (L)  8.6 - 10.5 mg/dL Final    BUN/Creatinine Ratio 03/16/2024 20.3  7.0 - 25.0 Final    Anion Gap 03/16/2024 10.4  5.0 - 15.0 mmol/L Final    eGFR 03/16/2024 97.0  >60.0 mL/min/1.73 Final    WBC 03/16/2024 12.09 (H)  3.40 - 10.80 10*3/mm3 Final    RBC 03/16/2024 4.15  3.77 - 5.28 10*6/mm3 Final    Hemoglobin 03/16/2024 11.7 (L)  12.0 - 15.9 g/dL Final    Hematocrit 03/16/2024 35.8  34.0 - 46.6 % Final    MCV 03/16/2024 86.3  79.0 - 97.0 fL Final    MCH 03/16/2024 28.2  26.6 - 33.0 pg Final    MCHC 03/16/2024 32.7  31.5 - 35.7 g/dL Final    RDW 03/16/2024 12.5  12.3 - 15.4 % Final    RDW-SD 03/16/2024 39.1  37.0 - 54.0 fl Final    MPV 03/16/2024 10.1  6.0 - 12.0 fL Final    Platelets 03/16/2024 269  140 - 450 10*3/mm3 Final    Neutrophil % 03/16/2024 87.5 (H)  42.7 - 76.0 % Final    Lymphocyte % 03/16/2024 5.9 (L)  19.6 - 45.3 % Final     "Monocyte % 03/16/2024 6.3  5.0 - 12.0 % Final    Eosinophil % 03/16/2024 0.0 (L)  0.3 - 6.2 % Final    Basophil % 03/16/2024 0.1  0.0 - 1.5 % Final    Immature Grans % 03/16/2024 0.2  0.0 - 0.5 % Final    Neutrophils, Absolute 03/16/2024 10.58 (H)  1.70 - 7.00 10*3/mm3 Final    Lymphocytes, Absolute 03/16/2024 0.71  0.70 - 3.10 10*3/mm3 Final    Monocytes, Absolute 03/16/2024 0.76  0.10 - 0.90 10*3/mm3 Final    Eosinophils, Absolute 03/16/2024 0.00  0.00 - 0.40 10*3/mm3 Final    Basophils, Absolute 03/16/2024 0.01  0.00 - 0.20 10*3/mm3 Final    Immature Grans, Absolute 03/16/2024 0.03  0.00 - 0.05 10*3/mm3 Final    nRBC 03/16/2024 0.0  0.0 - 0.2 /100 WBC Final       No results found for: \"URICACID\"  No results found for: \"CRYSTAL\"  Microbiology Results (last 10 days)       ** No results found for the last 240 hours. **          XR Knee 1 or 2 View Right    Result Date: 3/15/2024  Right knee arthroplasty as expected.  This report was finalized on 3/15/2024 4:14 PM by Dr. Alan Hwang M.D on Workstation: [id number]       Discharge and Follow up Instructions:     Total Knee Joint Replacement Discharge Instructions:    I. ACTIVITIES:  1. Exercises:  Complete exercise program as taught by the hospital physical therapist 2 times per day  Exercise program will be advanced by your home health physical therapist  During the day be up ambulating every 2 hours (while awake) for short distances  Complete the ankle pump exercises at least 10 times per hour (while awake)  Elevate legs most of the day the first week post operatively and thereafter elevate legs when in bed and for at least 30 minutes during the day.   Caution must be taken to avoid pillow placement under the bend of the knee as this can led to flexion contractures of the knee. Pillow placement under the heel is encouraged.  Use cold packs 20-30 minutes approximately 5 times per day. This should be done before and after completing your exercises and at any time " you are experiencing pain/ stiffness in your operative extremity.      2. Activities of Daily Living:  No tub baths, hot tubs, or swimming pools for 4 weeks  May shower and let water run over the incision on post-operative day #5 if no drainage. Do not scrub or rub the incision. Simply let the water run over the incision and pat dry.    II. Restrictions  Do not cross legs or kneel  Your surgeon will discuss with you when you will be able to drive again. Usual guidelines are you are to be off pain medications prior to driving.  Weight bearing is as tolerated  First week stay inside on even terrain. May go up and down stairs one stair at a time utilizing the hand rail.  After one week, you may venture outside.    III. Precautions:  Everyone that comes near you should wash their hands  No elective dental, genital-urinary, or colon procedures or surgical procedures for 12 weeks after surgery unless absolutely necessary.   If dental work or surgical procedure is deemed absolutely necessary, you will need to contact your surgeon as you will need to take antibiotics 1 hour prior to any dental work (including teeth cleanings).  Please discuss with your surgeon prophylactic antibiotics as the length of time this intervention will be necessary for you varies with each patient’s health history and situation.  Avoid sick people. If you must be around someone who is ill, they should wear a mask.  Avoid visits to the Emergency Room or Urgent Care. If you feel you need to go to the emergency room, please notify your surgeon.    Stockings are to be worn for one week after surgery and are to be placed on in the morning and removed at night. Observe your skin when stocking is removed for any problems. Monitor the stockings to ensure that any swelling is not causing the stockings to become too tight. In this case, remove stockings immediately.    IV. INCISION CARE:  Wash your hands prior to dressing changes  Change the dressing as  needed to keep incision clean and dry. Utilize dry gauze and paper tape. Avoid touching the side of the gauze that goes against the incision with your hands.  No creams or ointments to the incision  May remove dressing once the incision is free of drainage  Do not touch or pick at the incision  Check incision every day and notify surgeon immediately if any of the following signs or symptoms are noted:  Increase in redness  Increase in swelling around the incision and of the entire extremity  Increase in pain  Drainage oozing from the incision  Pulling apart of the edges of the incision  Increase in overall body temperature (greater than 100.5 degrees)     You have absorbable sutures with steristrips, please do not remove the steri strips for 14 days, you can shower on them 6 days after surgery.      V. Medications:   1. Anticoagulants: You will be discharged on an anticoagulant. This is a prophylactic medication that helps prevent blood clots during your post-operative period.  You will be on Aspirin  81 mg twice daily for 30 days. If you were on Aspirin 81 mg prior to surgery you can go back to home dose once the 30 days are completed.     While taking the anticoagulant, you should avoid taking any additional aspirin, ibuprofen (Advil or Motrin), Aleve (Naprosyn) or other non-steroidal anti-inflammatory medications.   Notify surgeon immediately if any francia bleeding is noted in the urine, stool, emesis, or from the nose or the incision. Blood in the stool will often appear as black rather than red. Blood in urine may appear as pink. Blood in emesis may appear as brown/black like coffee grounds.  You will need to apply pressure for longer periods of time to any cuts or abrasions to stop bleeding  Avoid alcohol while taking anticoagulants    2. Stool Softeners: You will be at greater risk of constipation after surgery due to being less mobile and the pain medications.   Take stool softeners as instructed by your  surgeon while on pain medications. Over the counter Colace 100 mg 1-2 capsules twice daily.   If stools become too loose or too frequent, please decreases the dosage or stop the stool softener.  If constipation occurs despite use of stool softeners, you are to continue the stool softeners and add a laxative (Milk of Magnesia 1 ounce daily as needed).  Dulcolax oral tabs or suppository, or a fleets enema can also be utilized for constipation and can be obtained over the counter.   If above interventions are unsuccessful in inducing bowel movements, please contact your surgeon's office / family physician's office.  Drink plenty of fluids, and eat fruits and vegetables during your recovery time    3. Pain Medications utilized after surgery are narcotics and the law requires that the following information be given to all patients that are prescribed narcotics:  CLASSIFICATION: Pain medications are called Opioids and are narcotics  LEGALITIES: It is illegal to share narcotics with others and to drive within 24 hours of taking narcotics  POTENTIAL SIDE EFFECTS: Potential side effects of opioids include: nausea, vomiting, itching, dizziness, drowsiness, dry mouth, constipation, and difficulty urinating.  POTENTIAL ADVERSE EFFECTS:   Opioid tolerance can develop with use of pain medications and this simply means that it requires more and more of the medication to control pain; however, this is seen more in patients that use opioids for longer periods of time.  Opioid dependence can develop with use of Opioids and this simply means that to stop the medication can cause withdrawal symptoms; however, this is seen with patients that use Opioids for longer periods of time.  Opioid addiction can develop with use of Opioids and the incidence of this is very unlikely in patients who take the medications as ordered and stop the medications as instructed.  Opioid overdose can be dangerous, but is unlikely when the medication is taken  as ordered and stopped when ordered. It is important not to mix opioids with alcohol or with and type of sedative such as Benadryl as this can lead to over sedation and respiratory difficulty.  DOSAGE:   Pain medications will need to be taken consistently for the first week to decrease pain and promote adequate pain relief and participation in physical therapy.  After the initial surgical pain begins to resolve, you may begin to decrease the pain medication. By the end of 6 weeks, you should be off of pain medications.  Refills will not be given by the office during evening hours, on weekends, or after 6 weeks post-op.  To seek refills on pain medications during the initial 6 week post-operative period, you must call the office 48 hours in advance to request the refill. The office will then notify you when to  the prescription. DO NOT wait until you are out of the medication to request a refill.    V. FOLLOW-UP VISITS:  You will need to follow up in the office with your surgeon on April 2, 2024.  Please call this number [phone] to schedule this appointment.  If you have any concerns or suspected complications prior to your follow up visit, please call your surgeons office. Do not wait until your appointment time if you suspect complications. These will need to be addressed in the office promptly.      Date:     Ajay Bridges MD    CC: Gloria Fleming, APRN; MD Yuliana Jacobs, Ajay LUGO,*

## 2024-03-16 NOTE — PROGRESS NOTES
"Orthopaedic Surgery  Daily Progress Note    /69 (BP Location: Left arm, Patient Position: Lying)   Pulse 82   Temp 98 °F (36.7 °C) (Oral)   Resp 16   Ht 160 cm (62.99\")   Wt 89 kg (196 lb 1.6 oz)   SpO2 96%   BMI 34.75 kg/m²     Lab Results (last 24 hours)       Procedure Component Value Units Date/Time    Basic Metabolic Panel [[id number]]  (Abnormal) Collected: 03/16/24 0511    Specimen: Blood Updated: 03/16/24 0639     Glucose 143 mg/dL      BUN 13 mg/dL      Creatinine 0.64 mg/dL      Sodium 139 mmol/L      Potassium 4.3 mmol/L      Comment: Slight hemolysis detected by analyzer. Result may be falsely elevated.        Chloride 105 mmol/L      CO2 23.6 mmol/L      Calcium 8.4 mg/dL      BUN/Creatinine Ratio 20.3     Anion Gap 10.4 mmol/L      eGFR 97.0 mL/min/1.73     Narrative:      GFR Normal >60  Chronic Kidney Disease <60  Kidney Failure <15      CBC & Differential [[id number]]  (Abnormal) Collected: 03/16/24 0511    Specimen: Blood Updated: 03/16/24 0558    Narrative:      The following orders were created for panel order CBC & Differential.  Procedure                               Abnormality         Status                     ---------                               -----------         ------                     CBC Auto Differential[[id number]]        Abnormal            Final result                 Please view results for these tests on the individual orders.    CBC Auto Differential [[id number]]  (Abnormal) Collected: 03/16/24 0511    Specimen: Blood Updated: 03/16/24 0558     WBC 12.09 10*3/mm3      RBC 4.15 10*6/mm3      Hemoglobin 11.7 g/dL      Hematocrit 35.8 %      MCV 86.3 fL      MCH 28.2 pg      MCHC 32.7 g/dL      RDW 12.5 %      RDW-SD 39.1 fl      MPV 10.1 fL      Platelets 269 10*3/mm3      Neutrophil % 87.5 %      Lymphocyte % 5.9 %      Monocyte % 6.3 %      Eosinophil % 0.0 %      Basophil % 0.1 %      Immature Grans % 0.2 %      Neutrophils, Absolute 10.58 10*3/mm3      " Lymphocytes, Absolute 0.71 10*3/mm3      Monocytes, Absolute 0.76 10*3/mm3      Eosinophils, Absolute 0.00 10*3/mm3      Basophils, Absolute 0.01 10*3/mm3      Immature Grans, Absolute 0.03 10*3/mm3      nRBC 0.0 /100 WBC             Imaging Results (Last 24 Hours)       Procedure Component Value Units Date/Time    XR Knee 1 or 2 View Right [[id number]] Collected: 03/15/24 1614     Updated: 03/15/24 1617    Narrative:      PORTABLE JOINT X-RAY     HISTORY: Right knee arthroplasty.     Portable x-ray of the right knee is provided.     FINDINGS:  There is arthroplasty hardware, positioned as expected.  No  periprosthetic fracture is identified.  There are expected post  operative   changes in the soft tissues.       Impression:      Right knee arthroplasty as expected.     This report was finalized on 3/15/2024 4:14 PM by Dr. Alan Hwang M.D on Workstation: [id number]               Patient Care Team:  Gloria Fleming APRN as PCP - General (Nurse Practitioner)    SUBJECTIVE  Pain controlled. Eat breakfast. Tolerating diet without N/V. Voiding without difficulty. Has not been evaluated by PT postop.    PHYSICAL EXAM  Resting in NAD  Ace Wrap clean, dry, intact  Toes warm, perfused, brisk capillary refill  Flexes/extends toes   SILT over toes  No pain with passive stretch  DP and PT pulses are palpable  Calf is soft and nontender           Hypertension    GERD (gastroesophageal reflux disease)    S/P TKR (total knee replacement) using cement, right      PLAN / DISPOSITION:  POD 1 s/p right TKA by Dr. Bridges, doing well  1. Pain control: Orals  2.  Antibiotics: Periop Ancef to end today  3.  PT: WBAT; PT to eval prior to discharge  4. DVT: Aspirin 81 mg PO BID plus ANA/SCDs, mobilization  5. Dispo: home today with [facility] PT if cleared by PT and [facility]. Follow up with Dr. Bridges in 10-14 days.      NAIN Figueredo  03/16/24  07:47 EDT

## (undated) DEVICE — SOL ISO/ALC 70PCT 4OZ

## (undated) DEVICE — SUT ETHIB 0 CT1 CR8 18IN CX21D

## (undated) DEVICE — CONTAINER,SPECIMEN,OR STERILE,4OZ: Brand: MEDLINE

## (undated) DEVICE — ANTIBACTERIAL UNDYED BRAIDED (POLYGLACTIN 910), SYNTHETIC ABSORBABLE SUTURE: Brand: COATED VICRYL

## (undated) DEVICE — PIN DRL NOHEAD TROC 3.2X75MM

## (undated) DEVICE — SYS CLS SKIN PREMIERPRO EXOFINFUSION 22CM

## (undated) DEVICE — GLV SURG BIOGEL LTX PF 8

## (undated) DEVICE — TBG PENCL TELESCP MEGADYNE SMOKE EVAC 10FT

## (undated) DEVICE — PK KN TOTL 40

## (undated) DEVICE — GLV SURG PREMIERPRO ORTHO LTX PF SZ8 BRN

## (undated) DEVICE — PATIENT RETURN ELECTRODE, SINGLE-USE, CONTACT QUALITY MONITORING, ADULT, WITH 9FT CORD, FOR PATIENTS WEIGING OVER 33LBS. (15KG): Brand: MEGADYNE

## (undated) DEVICE — TRAP FLD MINIVAC MEGADYNE 100ML

## (undated) DEVICE — GLV SURG SIGNATURE ESSENTIAL PF LTX SZ8

## (undated) DEVICE — APPL CHLORAPREP HI/LITE 26ML ORNG

## (undated) DEVICE — DUAL CUT SAGITTAL BLADE

## (undated) DEVICE — SYR CONTRL PRESS/LO FIX/M/LL W/THMB/RNG 10ML

## (undated) DEVICE — DRAPE,REIN 53X77,STERILE: Brand: MEDLINE

## (undated) DEVICE — NEEDLE, QUINCKE 22GX3.5": Brand: MEDLINE INDUSTRIES, INC.

## (undated) DEVICE — SKIN PREP TRAY W/CHG: Brand: MEDLINE INDUSTRIES, INC.